# Patient Record
Sex: FEMALE | Race: BLACK OR AFRICAN AMERICAN | NOT HISPANIC OR LATINO | URBAN - METROPOLITAN AREA
[De-identification: names, ages, dates, MRNs, and addresses within clinical notes are randomized per-mention and may not be internally consistent; named-entity substitution may affect disease eponyms.]

---

## 2024-01-14 PROBLEM — H93.13 BILATERAL TINNITUS: Status: ACTIVE | Noted: 2024-01-14

## 2024-01-14 PROBLEM — I63.9: Status: RESOLVED | Noted: 2023-12-08 | Resolved: 2024-01-14

## 2024-01-14 PROBLEM — N39.41 URGE INCONTINENCE: Status: ACTIVE | Noted: 2024-01-14

## 2024-01-14 PROBLEM — N18.4 CKD (CHRONIC KIDNEY DISEASE) STAGE 4, GFR 15-29 ML/MIN (MULTI): Status: ACTIVE | Noted: 2023-02-10

## 2024-01-14 PROBLEM — H90.3 BILATERAL SENSORINEURAL HEARING LOSS: Status: ACTIVE | Noted: 2024-01-14

## 2024-01-14 PROBLEM — I10 ESSENTIAL HYPERTENSION: Status: ACTIVE | Noted: 2018-01-24

## 2024-01-14 PROBLEM — I87.2 CHRONIC VENOUS INSUFFICIENCY: Status: ACTIVE | Noted: 2018-11-21

## 2024-01-14 PROBLEM — Z86.711 HISTORY OF PULMONARY EMBOLISM: Status: ACTIVE | Noted: 2018-01-24

## 2024-01-14 PROBLEM — I83.93 ASYMPTOMATIC VARICOSE VEINS OF BOTH LOWER EXTREMITIES: Status: ACTIVE | Noted: 2018-08-15

## 2024-01-14 PROBLEM — D63.1 ANEMIA OF RENAL DISEASE: Status: ACTIVE | Noted: 2022-03-01

## 2024-01-14 PROBLEM — M51.369 DISC DEGENERATION, LUMBAR: Status: ACTIVE | Noted: 2024-01-14

## 2024-01-14 PROBLEM — M17.0 PRIMARY OSTEOARTHRITIS OF BOTH KNEES: Status: ACTIVE | Noted: 2018-11-28

## 2024-01-14 PROBLEM — M48.061 SPINAL STENOSIS OF LUMBAR REGION WITHOUT NEUROGENIC CLAUDICATION: Status: ACTIVE | Noted: 2021-06-09

## 2024-01-14 PROBLEM — M1A.0790 CHRONIC GOUT OF FOOT: Status: ACTIVE | Noted: 2018-02-02

## 2024-01-14 PROBLEM — K57.30 DIVERTICULOSIS OF COLON: Status: ACTIVE | Noted: 2020-11-04

## 2024-01-14 PROBLEM — R13.19 ESOPHAGEAL DYSPHAGIA: Status: ACTIVE | Noted: 2018-02-02

## 2024-01-14 PROBLEM — K59.09 CHRONIC CONSTIPATION: Status: ACTIVE | Noted: 2018-02-02

## 2024-01-14 PROBLEM — M51.36 DISC DEGENERATION, LUMBAR: Status: ACTIVE | Noted: 2024-01-14

## 2024-01-14 PROBLEM — K21.9 GASTROESOPHAGEAL REFLUX DISEASE WITHOUT ESOPHAGITIS: Status: ACTIVE | Noted: 2018-02-02

## 2024-01-14 PROBLEM — Z98.84 HISTORY OF GASTRIC BYPASS: Status: ACTIVE | Noted: 2019-02-28

## 2024-01-14 PROBLEM — E03.9 HYPOTHYROIDISM (ACQUIRED): Status: ACTIVE | Noted: 2022-03-14

## 2024-01-14 PROBLEM — E83.42 HYPOMAGNESEMIA: Status: ACTIVE | Noted: 2021-09-09

## 2024-01-14 PROBLEM — D50.9 IRON DEFICIENCY ANEMIA: Status: ACTIVE | Noted: 2018-04-28

## 2024-01-14 PROBLEM — R91.8 LUNG NODULES: Status: ACTIVE | Noted: 2018-02-02

## 2024-01-14 PROBLEM — N95.2 ATROPHIC VAGINITIS: Status: ACTIVE | Noted: 2024-01-14

## 2024-01-14 PROBLEM — E79.0 HYPERURICEMIA: Status: ACTIVE | Noted: 2020-12-17

## 2024-01-14 PROBLEM — N18.9 ANEMIA OF RENAL DISEASE: Status: ACTIVE | Noted: 2022-03-01

## 2024-01-14 PROBLEM — I25.84 CORONARY ARTERY CALCIFICATION: Status: ACTIVE | Noted: 2023-01-11

## 2024-01-14 PROBLEM — I63.9: Status: ACTIVE | Noted: 2023-12-08

## 2024-01-14 PROBLEM — H81.10 BENIGN PAROXYSMAL POSITIONAL VERTIGO: Status: ACTIVE | Noted: 2024-01-14

## 2024-01-14 PROBLEM — M16.0 PRIMARY OSTEOARTHRITIS OF BOTH HIPS: Status: ACTIVE | Noted: 2018-01-24

## 2024-01-14 PROBLEM — E55.9 VITAMIN D DEFICIENCY: Status: ACTIVE | Noted: 2018-02-02

## 2024-01-14 PROBLEM — E04.2 MULTINODULAR THYROID: Status: ACTIVE | Noted: 2018-02-02

## 2024-01-14 PROBLEM — J30.2 SEASONAL ALLERGIC RHINITIS: Status: ACTIVE | Noted: 2018-02-02

## 2024-01-14 PROBLEM — E78.5 HYPERLIPIDEMIA: Status: ACTIVE | Noted: 2023-12-07

## 2024-01-14 PROBLEM — N25.81 SECONDARY RENAL HYPERPARATHYROIDISM (MULTI): Status: ACTIVE | Noted: 2020-12-17

## 2024-01-14 PROBLEM — H72.01 CENTRAL PERFORATION OF TYMPANIC MEMBRANE, RIGHT: Status: ACTIVE | Noted: 2024-01-14

## 2024-01-14 PROBLEM — K63.5 HYPERPLASTIC POLYP OF LARGE INTESTINE: Status: ACTIVE | Noted: 2018-02-02

## 2024-01-14 PROBLEM — G47.09 OTHER INSOMNIA: Status: ACTIVE | Noted: 2018-02-02

## 2024-01-14 PROBLEM — N81.4 PROLAPSE, UTEROVAGINAL: Status: ACTIVE | Noted: 2024-01-14

## 2024-01-14 PROBLEM — F32.A CHRONIC DEPRESSION: Status: ACTIVE | Noted: 2018-02-02

## 2024-01-14 PROBLEM — I25.10 CORONARY ARTERY CALCIFICATION: Status: ACTIVE | Noted: 2023-01-11

## 2024-01-14 PROBLEM — I48.0 PAROXYSMAL ATRIAL FIBRILLATION (MULTI): Status: ACTIVE | Noted: 2020-11-10

## 2024-01-15 ENCOUNTER — OFFICE VISIT (OUTPATIENT)
Dept: PRIMARY CARE | Facility: CLINIC | Age: 74
End: 2024-01-15
Payer: MEDICARE

## 2024-01-15 ENCOUNTER — LAB (OUTPATIENT)
Dept: LAB | Facility: LAB | Age: 74
End: 2024-01-15
Payer: MEDICARE

## 2024-01-15 VITALS
SYSTOLIC BLOOD PRESSURE: 128 MMHG | HEIGHT: 67 IN | DIASTOLIC BLOOD PRESSURE: 74 MMHG | OXYGEN SATURATION: 98 % | BODY MASS INDEX: 36.73 KG/M2 | WEIGHT: 234 LBS | HEART RATE: 68 BPM

## 2024-01-15 DIAGNOSIS — Z86.711 HISTORY OF PULMONARY EMBOLISM: ICD-10-CM

## 2024-01-15 DIAGNOSIS — I48.0 PAROXYSMAL ATRIAL FIBRILLATION (MULTI): ICD-10-CM

## 2024-01-15 DIAGNOSIS — N18.9 ANEMIA OF RENAL DISEASE: ICD-10-CM

## 2024-01-15 DIAGNOSIS — J30.2 SEASONAL ALLERGIC RHINITIS, UNSPECIFIED TRIGGER: ICD-10-CM

## 2024-01-15 DIAGNOSIS — R91.8 LUNG NODULES: ICD-10-CM

## 2024-01-15 DIAGNOSIS — E55.9 VITAMIN D DEFICIENCY: ICD-10-CM

## 2024-01-15 DIAGNOSIS — N18.4 CKD (CHRONIC KIDNEY DISEASE) STAGE 4, GFR 15-29 ML/MIN (MULTI): ICD-10-CM

## 2024-01-15 DIAGNOSIS — Z12.31 ENCOUNTER FOR SCREENING MAMMOGRAM FOR MALIGNANT NEOPLASM OF BREAST: ICD-10-CM

## 2024-01-15 DIAGNOSIS — E79.0 HYPERURICEMIA: ICD-10-CM

## 2024-01-15 DIAGNOSIS — Z00.00 MEDICARE ANNUAL WELLNESS VISIT, SUBSEQUENT: Primary | ICD-10-CM

## 2024-01-15 DIAGNOSIS — R26.9 GAIT DISORDER: ICD-10-CM

## 2024-01-15 DIAGNOSIS — N25.81 SECONDARY RENAL HYPERPARATHYROIDISM (MULTI): ICD-10-CM

## 2024-01-15 DIAGNOSIS — D63.1 ANEMIA OF RENAL DISEASE: ICD-10-CM

## 2024-01-15 DIAGNOSIS — Z96.643 S/P BILATERAL HIP REPLACEMENTS: ICD-10-CM

## 2024-01-15 DIAGNOSIS — E03.9 HYPOTHYROIDISM (ACQUIRED): Primary | ICD-10-CM

## 2024-01-15 DIAGNOSIS — Z12.11 COLON CANCER SCREENING: ICD-10-CM

## 2024-01-15 DIAGNOSIS — I10 ESSENTIAL HYPERTENSION: ICD-10-CM

## 2024-01-15 DIAGNOSIS — E03.9 HYPOTHYROIDISM (ACQUIRED): ICD-10-CM

## 2024-01-15 DIAGNOSIS — K21.9 GASTROESOPHAGEAL REFLUX DISEASE WITHOUT ESOPHAGITIS: ICD-10-CM

## 2024-01-15 DIAGNOSIS — E78.2 MIXED HYPERLIPIDEMIA: ICD-10-CM

## 2024-01-15 DIAGNOSIS — I25.84 CORONARY ARTERY CALCIFICATION: ICD-10-CM

## 2024-01-15 DIAGNOSIS — K59.09 CHRONIC CONSTIPATION: ICD-10-CM

## 2024-01-15 DIAGNOSIS — I25.10 CORONARY ARTERY CALCIFICATION: ICD-10-CM

## 2024-01-15 DIAGNOSIS — E28.39 ESTROGEN DEFICIENCY: ICD-10-CM

## 2024-01-15 PROBLEM — N81.4 PROLAPSE, UTEROVAGINAL: Status: RESOLVED | Noted: 2024-01-14 | Resolved: 2024-01-15

## 2024-01-15 PROBLEM — Z96.652 STATUS POST LEFT KNEE REPLACEMENT: Status: ACTIVE | Noted: 2024-01-15

## 2024-01-15 LAB
ALBUMIN SERPL BCP-MCNC: 3.9 G/DL (ref 3.4–5)
ALP SERPL-CCNC: 88 U/L (ref 33–136)
ALT SERPL W P-5'-P-CCNC: 11 U/L (ref 7–45)
ANION GAP SERPL CALC-SCNC: 13 MMOL/L (ref 10–20)
APPEARANCE UR: CLEAR
AST SERPL W P-5'-P-CCNC: 20 U/L (ref 9–39)
BASOPHILS # BLD AUTO: 0.06 X10*3/UL (ref 0–0.1)
BASOPHILS NFR BLD AUTO: 1 %
BILIRUB SERPL-MCNC: 0.4 MG/DL (ref 0–1.2)
BILIRUB UR STRIP.AUTO-MCNC: NEGATIVE MG/DL
BUN SERPL-MCNC: 22 MG/DL (ref 6–23)
CALCIUM SERPL-MCNC: 9.7 MG/DL (ref 8.6–10.6)
CHLORIDE SERPL-SCNC: 108 MMOL/L (ref 98–107)
CHOLEST SERPL-MCNC: 136 MG/DL (ref 0–199)
CHOLESTEROL/HDL RATIO: 2.7
CO2 SERPL-SCNC: 28 MMOL/L (ref 21–32)
COLOR UR: YELLOW
CREAT SERPL-MCNC: 1.84 MG/DL (ref 0.5–1.05)
EGFRCR SERPLBLD CKD-EPI 2021: 29 ML/MIN/1.73M*2
EOSINOPHIL # BLD AUTO: 0.09 X10*3/UL (ref 0–0.4)
EOSINOPHIL NFR BLD AUTO: 1.5 %
ERYTHROCYTE [DISTWIDTH] IN BLOOD BY AUTOMATED COUNT: 14.7 % (ref 11.5–14.5)
GLUCOSE SERPL-MCNC: 91 MG/DL (ref 74–99)
GLUCOSE UR STRIP.AUTO-MCNC: NEGATIVE MG/DL
HCT VFR BLD AUTO: 38.4 % (ref 36–46)
HDLC SERPL-MCNC: 50.9 MG/DL
HGB BLD-MCNC: 12.7 G/DL (ref 12–16)
IMM GRANULOCYTES # BLD AUTO: 0.01 X10*3/UL (ref 0–0.5)
IMM GRANULOCYTES NFR BLD AUTO: 0.2 % (ref 0–0.9)
KETONES UR STRIP.AUTO-MCNC: NEGATIVE MG/DL
LDLC SERPL CALC-MCNC: 72 MG/DL
LEUKOCYTE ESTERASE UR QL STRIP.AUTO: NEGATIVE
LYMPHOCYTES # BLD AUTO: 1.34 X10*3/UL (ref 0.8–3)
LYMPHOCYTES NFR BLD AUTO: 22.3 %
MCH RBC QN AUTO: 26.7 PG (ref 26–34)
MCHC RBC AUTO-ENTMCNC: 33.1 G/DL (ref 32–36)
MCV RBC AUTO: 81 FL (ref 80–100)
MONOCYTES # BLD AUTO: 0.59 X10*3/UL (ref 0.05–0.8)
MONOCYTES NFR BLD AUTO: 9.8 %
NEUTROPHILS # BLD AUTO: 3.93 X10*3/UL (ref 1.6–5.5)
NEUTROPHILS NFR BLD AUTO: 65.2 %
NITRITE UR QL STRIP.AUTO: NEGATIVE
NON HDL CHOLESTEROL: 85 MG/DL (ref 0–149)
NRBC BLD-RTO: 0 /100 WBCS (ref 0–0)
PH UR STRIP.AUTO: 5 [PH]
PLATELET # BLD AUTO: 210 X10*3/UL (ref 150–450)
POTASSIUM SERPL-SCNC: 4.1 MMOL/L (ref 3.5–5.3)
PROT SERPL-MCNC: 6.5 G/DL (ref 6.4–8.2)
PROT UR STRIP.AUTO-MCNC: NEGATIVE MG/DL
RBC # BLD AUTO: 4.75 X10*6/UL (ref 4–5.2)
RBC # UR STRIP.AUTO: NEGATIVE /UL
SODIUM SERPL-SCNC: 145 MMOL/L (ref 136–145)
SP GR UR STRIP.AUTO: 1.01
TRIGL SERPL-MCNC: 64 MG/DL (ref 0–149)
URATE SERPL-MCNC: 7 MG/DL (ref 2.3–6.7)
UROBILINOGEN UR STRIP.AUTO-MCNC: <2 MG/DL
VLDL: 13 MG/DL (ref 0–40)
WBC # BLD AUTO: 6 X10*3/UL (ref 4.4–11.3)

## 2024-01-15 PROCEDURE — 81003 URINALYSIS AUTO W/O SCOPE: CPT

## 2024-01-15 PROCEDURE — 1159F MED LIST DOCD IN RCRD: CPT | Performed by: INTERNAL MEDICINE

## 2024-01-15 PROCEDURE — 1126F AMNT PAIN NOTED NONE PRSNT: CPT | Performed by: INTERNAL MEDICINE

## 2024-01-15 PROCEDURE — 85025 COMPLETE CBC W/AUTO DIFF WBC: CPT

## 2024-01-15 PROCEDURE — 80053 COMPREHEN METABOLIC PANEL: CPT

## 2024-01-15 PROCEDURE — 1036F TOBACCO NON-USER: CPT | Performed by: INTERNAL MEDICINE

## 2024-01-15 PROCEDURE — 36415 COLL VENOUS BLD VENIPUNCTURE: CPT

## 2024-01-15 PROCEDURE — 80061 LIPID PANEL: CPT

## 2024-01-15 PROCEDURE — G0439 PPPS, SUBSEQ VISIT: HCPCS | Performed by: INTERNAL MEDICINE

## 2024-01-15 PROCEDURE — 3074F SYST BP LT 130 MM HG: CPT | Performed by: INTERNAL MEDICINE

## 2024-01-15 PROCEDURE — 84550 ASSAY OF BLOOD/URIC ACID: CPT

## 2024-01-15 PROCEDURE — 3078F DIAST BP <80 MM HG: CPT | Performed by: INTERNAL MEDICINE

## 2024-01-15 PROCEDURE — 99214 OFFICE O/P EST MOD 30 MIN: CPT | Performed by: INTERNAL MEDICINE

## 2024-01-15 RX ORDER — LEVOTHYROXINE SODIUM 112 UG/1
112 TABLET ORAL
Qty: 90 TABLET | Refills: 3 | Status: SHIPPED | OUTPATIENT
Start: 2024-01-15 | End: 2024-05-15 | Stop reason: SDUPTHER

## 2024-01-15 RX ORDER — MECLIZINE HYDROCHLORIDE 25 MG/1
25 TABLET ORAL 3 TIMES DAILY PRN
COMMUNITY
Start: 2015-10-19

## 2024-01-15 RX ORDER — ASPIRIN 81 MG
1 TABLET, DELAYED RELEASE (ENTERIC COATED) ORAL DAILY
COMMUNITY

## 2024-01-15 RX ORDER — ROSUVASTATIN CALCIUM 10 MG/1
1 TABLET, COATED ORAL NIGHTLY
COMMUNITY
Start: 2023-01-11

## 2024-01-15 RX ORDER — POTASSIUM CHLORIDE 20 MEQ/1
1 TABLET, EXTENDED RELEASE ORAL 2 TIMES DAILY
COMMUNITY
Start: 2013-12-12 | End: 2024-01-15 | Stop reason: ALTCHOICE

## 2024-01-15 RX ORDER — TORSEMIDE 10 MG/1
10 TABLET ORAL DAILY
Qty: 90 TABLET | Refills: 3 | Status: SHIPPED | OUTPATIENT
Start: 2024-01-15

## 2024-01-15 RX ORDER — LEVOTHYROXINE SODIUM 112 UG/1
112 TABLET ORAL
COMMUNITY
Start: 2023-01-11 | End: 2024-01-15 | Stop reason: SDUPTHER

## 2024-01-15 RX ORDER — ALLOPURINOL 100 MG/1
50 TABLET ORAL
COMMUNITY
Start: 2023-12-08

## 2024-01-15 RX ORDER — PANTOPRAZOLE SODIUM 40 MG/1
40 TABLET, DELAYED RELEASE ORAL
COMMUNITY
Start: 2021-09-26

## 2024-01-15 RX ORDER — TORSEMIDE 10 MG/1
10 TABLET ORAL DAILY
COMMUNITY
End: 2024-01-15 | Stop reason: SDUPTHER

## 2024-01-15 RX ORDER — ASCORBIC ACID 500 MG
500 TABLET ORAL
COMMUNITY
Start: 2023-01-11

## 2024-01-15 RX ORDER — FERROUS SULFATE 325(65) MG
1 TABLET ORAL
COMMUNITY
Start: 2022-06-15

## 2024-01-15 RX ORDER — CALCITRIOL 0.25 UG/1
0.25 CAPSULE ORAL DAILY
COMMUNITY

## 2024-01-15 RX ORDER — CYCLOBENZAPRINE HCL 5 MG
1 TABLET ORAL 2 TIMES DAILY PRN
COMMUNITY
Start: 2021-05-30 | End: 2024-01-15 | Stop reason: ALTCHOICE

## 2024-01-15 RX ORDER — ROPINIROLE 0.5 MG/1
TABLET, FILM COATED ORAL
COMMUNITY
Start: 2016-09-19 | End: 2024-01-15 | Stop reason: ALTCHOICE

## 2024-01-15 RX ORDER — LORATADINE 10 MG/1
10 TABLET ORAL DAILY
COMMUNITY
Start: 2021-09-26

## 2024-01-15 RX ORDER — NIFEDIPINE 60 MG/1
60 TABLET, EXTENDED RELEASE ORAL DAILY
COMMUNITY
End: 2024-05-13 | Stop reason: DRUGHIGH

## 2024-01-15 RX ORDER — TALC
1 POWDER (GRAM) TOPICAL 2 TIMES DAILY
COMMUNITY
Start: 2021-12-30

## 2024-01-15 RX ORDER — AMLODIPINE BESYLATE 5 MG/1
TABLET ORAL
COMMUNITY
Start: 2015-10-19 | End: 2024-01-15 | Stop reason: ALTCHOICE

## 2024-01-15 RX ORDER — CHOLECALCIFEROL (VITAMIN D3) 50 MCG
1 TABLET ORAL DAILY
COMMUNITY

## 2024-01-15 ASSESSMENT — ENCOUNTER SYMPTOMS
SHORTNESS OF BREATH: 0
CONSTIPATION: 0
MEMORY LOSS: 0
HEMATURIA: 0
PALPITATIONS: 0
OCCASIONAL FEELINGS OF UNSTEADINESS: 0
CHILLS: 0
NERVOUS/ANXIOUS: 0
MUSCLE CRAMPS: 0
ABDOMINAL PAIN: 0
DEPRESSION: 0
FEVER: 0
NAUSEA: 0
BACK PAIN: 0
LOSS OF SENSATION IN FEET: 0
COUGH: 0

## 2024-01-15 NOTE — PATIENT INSTRUCTIONS
Medicare annual wellness visit  Continued well-balanced diet rich in fruits, vegetables, fiber, lean protein recommended  Continued active lifestyle, walking for up to 15 or 20 minutes daily recommended  Breast exam normal and screening mammogram scheduled for February  Bone densitometry to screen for osteoporosis ordered  Providing a copy of advance directives to place in chart is recommended  Screening colonoscopy recommended (previous colonoscopy at [facility] in 2021 with poor prep with repeat colonoscopy recommended).  Dr. Mark contacted and recommends colonoscopy October/November 2024    Essential hypertension  Improved.  Continue nifedipine XL 60 mg daily and torsemide 10 mg daily  Continue home blood pressure monitoring and bring readings to each visit.  Bring blood pressure device to next visit (to assess accuracy)    Paroxysmal atrial fibrillation (regular rhythm today)  Continue Eliquis 5 mg twice a day  Upcoming appointment with Dr. Hudson in electrophysiology scheduled for February    Hyperlipidemia  Coronary artery calcification  Fasting lipid panel ordered  Continue rosuvastatin 10 mg daily  Continue well-balanced, low-fat/high-fiber diet  Continue annual follow-up with cardiology, Dr. Alfredo at [facility]    Chronic kidney disease (stage IV)  Secondary renal hyperparathyroidism  Vitamin D deficiency  Continue current medication regimen  Maintain adequate hydration, at least 50 ounces per day  Lab work ordered  Continue routine follow-up with [facility] nephrology, Sabrina Weber  Patient to follow-up with [facility] transplant team regarding whether additional follow-up recommended at this time    History of pulmonary embolism  Continue long-term anticoagulation with Eliquis    Anemia of renal disease  CBC lab work ordered    Hypothyroidism (TSH at goal on recent lab work)  Continue levothyroxine 112 mcg daily    Lung nodule/history of smoking  Continue annual CT lung cancer screening at [facility], next scheduled in  9/2024    GERD/heartburn  Continue pantoprazole 40 mg daily    Seasonal allergic rhinitis  Continue Claritin 10 mg daily as needed    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

## 2024-01-15 NOTE — PROGRESS NOTES
Bryan Garcia is a 70 year old here for a Medicare Annual Wellness Visit     Health Risk Assessment  In general, health is:  Good     Concerns with balance:  Good     Concerns with teeth or dentures:  No     Concerns with sexual function:  No     Felt anxious, stressed, angry, irritable, lonely, isolated, or had thoughts of hurting themself:  Rare     Has little interest or pleasure in doing things:  No     Bothered by feeling down, depressed, or hopeless:  No     Needs help with grocery shopping, cooking, housework, bathing, grooming, dressing, eating, sitting or standing, walking, using the toilet, handling finances, taking medications, using the telephone, or driving:  No     Following safety precautions in the home environment and vehicle: removed throw rugs from floors, installed grab bars in the bathroom, handrails in stairwells, having adequate lighting, wearing seatbelt at all times?:  Yes     Smokes cigarettes, vapes, or chew tobacco:  No     Eats healthy foods including fruits, vegetables, whole grains, and fiber-rich foods:  Regularly     Number of days per week engages in exercise:  Walking at house 2-3 times per week     Average alcohol consumption: None        Current Providers  Specialists: I have reviewed specialist-related care of the patient in the medical record.     Medical/Family history review  Reviewed and updated problem list, medical/surgical/family/social history, medications, and allergies.     Opioid use review  Patient use of opioids:  No           Depression screening  Depression Screening PHQ-2 Score   2/14/2023 0         Depression screening tool completed and reviewed. Based on score and interview, patient is not at risk for depression. Screening tool discussed with patient, and I recommended no further intervention at this time.     Cognitive screening  Mini Cog Score:  5     Cognitive screening reviewed and plan:  None     Functional Observation  Was the patient's timed Up  "& Go test unsteady or >= 12 seconds?  No     Advance Care Planning  End of Life planning discussed, including patient's advanced directive wishes:  Yes    Vision and Hearing assessment  Visual acuity (required for Welcome to Medicare):  Sees ophthalmology  Hearing Evaluation:  Normal    ====================================================    /74 (BP Location: Left arm, Patient Position: Sitting, BP Cuff Size: Adult)   Pulse 68   Ht 1.689 m (5' 6.5\")   Wt 106 kg (234 lb)   LMP  (LMP Unknown)   SpO2 98%   BMI 37.20 kg/m²     Chief Complaint   Patient presents with    Medicare Wellness Exam       Medicare annual wellness visit  Office visit for chronic medical conditions    Essential hypertension  In May, ARB (losartan) and spironolactone discontinued due to LINDSEY and hyperkalemia in setting of chronic kidney disease.  At last visit with nephrology at [facility] on 1/4, nifedipine dose increased from 30 mg to 60 mg daily.  Blood pressures have been better at home ranging between 120-130s/70s.  Blood pressure in office today is at goal.  Improved.  Continue nifedipine XL 60 mg daily and torsemide 10 mg daily    Paroxysmal atrial fibrillation   Amiodarone discontinued by Dr. Alfredo at last visit.  Recent visit with Dr. Hudson in December without change.  Patient remains on Eliquis alone  Next appointment with Dr. Hudson in February  Patient denies any palpitations, shortness of breath, bleeding complications.    Hyperlipidemia  Coronary artery calcification  Patient is doing well without chest pain or shortness of breath.  Proper diet reviewed.  Lipid panel ordered.    Chronic kidney disease (stage 4)  Previous LINDSEY in May has improved off losartan and spironolactone.  Last visit 2 weeks ago with nephrology at [facility], increasing nifedipine dosing.  Patient has undergone transplant workup at [facility] in September.  Patient has not heard back from transplant team, possibly secondary to improved creatinine per nephrology at last " visit.    Secondary renal hyperparathyroidism  Vitamin D deficiency  Current vitamin D level is at goal.    History of pulmonary embolism (recurrent), hypercoagulable state  Patient remains on long-term anticoagulation.    Anemia of renal disease  Most recent CBC stable, CBC lab work ordered    Hypothyroidism  Recent TSH in December at goal on current levothyroxine dose at 112 mcg daily.     Lung nodule/history of smoking  Patient has not smoked since 2009.  She is enrolled in [facility] lung cancer screening clinic, next CT/office visit scheduled in September 2024    GERD/heartburn  Patient denies any breakthrough symptoms or dysphagia, remains on pantoprazole.    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  Patient notes difficulty with hip flexion, history of left total hip arthroplasty per Dr. Flores    Hyperuricemia  Patient is currently on allopurinol 50 mg daily.    Chronic constipation  Regular, not forced bowel movements currently.  Only needs Colace as needed.    Health maintenance  Exercise: Active at home, occasional walking  Colon cancer screening: Most recent colonoscopy at [facility] 12/2021 with poor prep.  Dr. Mark to resume screening colonoscopies and recommends next colonoscopy in October/November 2024    Social  Lives in home independently, assisted by her daughter Flores Posey since 4/2/2018 ( Samir with pulmonary hypertension)          Review of Systems   Constitutional: Negative for chills and fever.   HENT:  Positive for tinnitus. Negative for hearing loss.    Eyes:  Negative for visual disturbance.   Cardiovascular:  Negative for chest pain and palpitations.   Respiratory:  Negative for cough and shortness of breath.    Endocrine: Negative for polyuria.   Skin:  Negative for rash.    Musculoskeletal:  Positive for arthritis. Negative for back pain and muscle cramps.   Gastrointestinal:  Negative for abdominal pain, constipation, dysphagia and nausea.   Genitourinary:  Negative for hematuria, pelvic pain and urgency.   Psychiatric/Behavioral:  Negative for memory loss. The patient is not nervous/anxious.         Physical Exam  Vitals reviewed.   Constitutional:       Appearance: Normal appearance.   HENT:      Head: Normocephalic.      Left Ear: Tympanic membrane normal.      Ears:      Comments: Central TM perforation (chronic)     Mouth/Throat:      Mouth: Mucous membranes are moist.   Eyes:      Conjunctiva/sclera: Conjunctivae normal.      Pupils: Pupils are equal, round, and reactive to light.   Cardiovascular:      Rate and Rhythm: Normal rate and regular rhythm.   Pulmonary:      Effort: Pulmonary effort is normal.      Breath sounds: Normal breath sounds.   Abdominal:      Palpations: Abdomen is soft.      Tenderness: There is no abdominal tenderness.   Genitourinary:     Comments: Breast exam: Normal nipples, no masses or axillary lymphadenopathy bilaterally.  Musculoskeletal:      Comments: Bilateral hips and knees: Diminished left hip flexion.  Bilateral knees with intact range of motion.  2+ bilateral lower calf and ankle edema   Skin:     General: Skin is warm.      Findings: No rash.   Neurological:      General: No focal deficit present.      Mental Status: She is alert.   Psychiatric:         Mood and Affect: Mood normal.           Assessment and Plan    Medicare annual wellness visit  Continued well-balanced diet rich in fruits, vegetables, fiber, lean protein recommended  Continued active lifestyle, walking for up to 15 or 20 minutes daily recommended  Breast exam normal and screening mammogram scheduled for February  Bone densitometry to screen for osteoporosis ordered  Providing a copy of advance directives to place in chart is recommended  Screening colonoscopy recommended  (previous colonoscopy at [facility] in 2021 with poor prep with repeat colonoscopy recommended).  Dr. Mark contacted and recommends colonoscopy October/November 2024    Essential hypertension  Improved.  Continue nifedipine XL 60 mg daily and torsemide 10 mg daily  Continue home blood pressure monitoring and bring readings to each visit.  Bring blood pressure device to next visit (to assess accuracy)    Paroxysmal atrial fibrillation (regular rhythm today)  Continue Eliquis 5 mg twice a day  Upcoming appointment with Dr. Hudson in electrophysiology scheduled for February    Hyperlipidemia  Coronary artery calcification  Fasting lipid panel ordered  Continue rosuvastatin 10 mg daily  Continue well-balanced, low-fat/high-fiber diet  Continue annual follow-up with cardiology, Dr. Alfredo at [facility]    Chronic kidney disease (stage IV)  Secondary renal hyperparathyroidism  Vitamin D deficiency  Continue current medication regimen  Maintain adequate hydration, at least 50 ounces per day  Lab work ordered  Continue routine follow-up with [facility] nephrology, Sabrina Weber  Patient to follow-up with [facility] transplant team regarding whether additional follow-up recommended at this time    History of pulmonary embolism  Continue long-term anticoagulation with Eliquis    Anemia of renal disease  CBC lab work ordered    Hypothyroidism (TSH at goal on recent lab work)  Continue levothyroxine 112 mcg daily    Lung nodule/history of smoking  Continue annual CT lung cancer screening at [facility], next scheduled in 9/2024    GERD/heartburn  Continue pantoprazole 40 mg daily    Seasonal allergic rhinitis  Continue Claritin 10 mg daily as needed    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

## 2024-01-15 NOTE — Clinical Note
Cecil, Our mutual patient was seen today for medical annual wellness visit. I am asking for your recommendation regarding timing of next screening colonoscopy. Last colonoscopy done at [facility] in 12/2021 during hospitalization for evaluation of GI symptoms/diarrhea.  Report and pathology are available in Care Everywhere. Thank you for your review and recommendation regarding timing of next screening colonoscopy. Jorge

## 2024-01-15 NOTE — PROGRESS NOTES
TSH ordered for next visit in May  ([facility] nephrology will be checking renal function panel, CBC urine)    Adán Meek MD

## 2024-01-16 ENCOUNTER — TELEPHONE (OUTPATIENT)
Dept: PRIMARY CARE | Facility: CLINIC | Age: 74
End: 2024-01-16
Payer: MEDICARE

## 2024-01-16 NOTE — LETTER
January 16, 2024    Bryan Garcia  [address] [facility] [address]      Dear Bryan:    I have enclosed a copy of your lab results for your review.  These results are also viewable to your other providers at [facility].    Electrolytes are normal, kidney function is stable, urinalysis is normal.  The CBC (blood counts) are normal without anemia.  The lipid panel results are also favorable/at goal on current dose of rosuvastatin.    Please contact my office if you have any questions or concerns regarding these results.      Sincerely,        Adán Meek MD  [facility]  [address] [place] [address], [place] [address]    Phone:  [phone]  Fax:  [phone]

## 2024-01-16 NOTE — TELEPHONE ENCOUNTER
Current lab work shows stable creatinine consistent with chronic kidney disease, stage 4  Lipid panel results are at goal, currently on rosuvastatin 10 mg daily.    Letter has been sent to patient with results, to review with other specialists at future visits.    Adán Meek MD

## 2024-03-06 ENCOUNTER — APPOINTMENT (OUTPATIENT)
Dept: RADIOLOGY | Facility: HOSPITAL | Age: 74
End: 2024-03-06
Payer: MEDICARE

## 2024-03-14 DIAGNOSIS — Z12.11 SPECIAL SCREENING FOR MALIGNANT NEOPLASMS, COLON: Primary | ICD-10-CM

## 2024-03-14 RX ORDER — SODIUM PICOSULFATE, MAGNESIUM OXIDE, AND ANHYDROUS CITRIC ACID 12; 3.5; 1 G/175ML; G/175ML; MG/175ML
LIQUID ORAL
Qty: 350 ML | Refills: 0 | Status: SHIPPED | OUTPATIENT
Start: 2024-03-14 | End: 2024-05-13 | Stop reason: ALTCHOICE

## 2024-03-19 ENCOUNTER — TELEPHONE (OUTPATIENT)
Dept: GASTROENTEROLOGY | Facility: CLINIC | Age: 74
End: 2024-03-19
Payer: MEDICARE

## 2024-03-19 DIAGNOSIS — Z12.11 SPECIAL SCREENING FOR MALIGNANT NEOPLASMS, COLON: Primary | ICD-10-CM

## 2024-03-19 RX ORDER — POLYETHYLENE GLYCOL-3350 AND ELECTROLYTES WITH FLAVOR PACK 240; 5.84; 2.98; 6.72; 22.72 G/278.26G; G/278.26G; G/278.26G; G/278.26G; G/278.26G
4000 POWDER, FOR SOLUTION ORAL ONCE
Qty: 4000 ML | Refills: 0 | Status: SHIPPED | OUTPATIENT
Start: 2024-03-19 | End: 2024-03-19

## 2024-03-19 NOTE — TELEPHONE ENCOUNTER
Phone - has colonoscopy tomorrow - Clenpiq was called in to her pharmacy - she has significant CKD - I rec: do not take Clenpiq, use Colyte instead

## 2024-03-19 NOTE — TELEPHONE ENCOUNTER
----- Message from Yamilex Robledo MA sent at 3/19/2024  3:18 PM EDT -----  Calling to ask if prep is safe for procedure tomorrow [phone]

## 2024-03-19 NOTE — TELEPHONE ENCOUNTER
----- Message from Yamilex Robledo MA sent at 3/19/2024 10:07 AM EDT -----  Please call has questions about prep [phone]

## 2024-03-20 ENCOUNTER — OFFICE VISIT (OUTPATIENT)
Dept: GASTROENTEROLOGY | Facility: EXTERNAL LOCATION | Age: 74
End: 2024-03-20
Payer: MEDICARE

## 2024-03-20 DIAGNOSIS — Z12.11 COLON CANCER SCREENING: ICD-10-CM

## 2024-03-20 DIAGNOSIS — D12.7 BENIGN NEOPLASM OF RECTOSIGMOID JUNCTION: ICD-10-CM

## 2024-03-20 DIAGNOSIS — I48.91 ATRIAL FIBRILLATION, UNSPECIFIED TYPE (MULTI): ICD-10-CM

## 2024-03-20 DIAGNOSIS — K57.30 DIVERTICULOSIS OF LARGE INTESTINE WITHOUT DIVERTICULITIS: ICD-10-CM

## 2024-03-20 DIAGNOSIS — Z86.010 PERSONAL HISTORY OF COLONIC POLYPS: Primary | ICD-10-CM

## 2024-03-20 PROCEDURE — 1036F TOBACCO NON-USER: CPT | Performed by: INTERNAL MEDICINE

## 2024-03-20 PROCEDURE — 88305 TISSUE EXAM BY PATHOLOGIST: CPT | Performed by: PATHOLOGY

## 2024-03-20 PROCEDURE — 88305 TISSUE EXAM BY PATHOLOGIST: CPT

## 2024-03-20 PROCEDURE — 45380 COLONOSCOPY AND BIOPSY: CPT | Performed by: INTERNAL MEDICINE

## 2024-03-20 PROCEDURE — 1159F MED LIST DOCD IN RCRD: CPT | Performed by: INTERNAL MEDICINE

## 2024-03-21 ENCOUNTER — LAB REQUISITION (OUTPATIENT)
Dept: LAB | Facility: HOSPITAL | Age: 74
End: 2024-03-21
Payer: MEDICARE

## 2024-03-25 ENCOUNTER — APPOINTMENT (OUTPATIENT)
Dept: RADIOLOGY | Facility: HOSPITAL | Age: 74
End: 2024-03-25
Payer: MEDICARE

## 2024-03-25 LAB
LABORATORY COMMENT REPORT: NORMAL
PATH REPORT.FINAL DX SPEC: NORMAL
PATH REPORT.GROSS SPEC: NORMAL
PATH REPORT.RELEVANT HX SPEC: NORMAL
PATH REPORT.TOTAL CANCER: NORMAL

## 2024-04-03 ENCOUNTER — HOSPITAL ENCOUNTER (OUTPATIENT)
Dept: RADIOLOGY | Facility: HOSPITAL | Age: 74
Discharge: HOME | End: 2024-04-03
Payer: MEDICARE

## 2024-04-03 VITALS — WEIGHT: 235 LBS | BODY MASS INDEX: 36.88 KG/M2 | HEIGHT: 67 IN

## 2024-04-03 DIAGNOSIS — Z12.31 ENCOUNTER FOR SCREENING MAMMOGRAM FOR MALIGNANT NEOPLASM OF BREAST: ICD-10-CM

## 2024-04-03 PROCEDURE — 77067 SCR MAMMO BI INCL CAD: CPT | Mod: BILATERAL PROCEDURE | Performed by: STUDENT IN AN ORGANIZED HEALTH CARE EDUCATION/TRAINING PROGRAM

## 2024-04-03 PROCEDURE — 77067 SCR MAMMO BI INCL CAD: CPT

## 2024-04-03 PROCEDURE — 77063 BREAST TOMOSYNTHESIS BI: CPT | Mod: BILATERAL PROCEDURE | Performed by: STUDENT IN AN ORGANIZED HEALTH CARE EDUCATION/TRAINING PROGRAM

## 2024-04-11 ENCOUNTER — TELEPHONE (OUTPATIENT)
Dept: PRIMARY CARE | Facility: CLINIC | Age: 74
End: 2024-04-11
Payer: MEDICARE

## 2024-04-11 DIAGNOSIS — N64.89 BREAST ASYMMETRY: ICD-10-CM

## 2024-04-11 NOTE — TELEPHONE ENCOUNTER
Kathytaniamarika is requesting diagnostic mammogram order, ultrasound order (right breast).  Orders pended.  Please sign.  Thank you

## 2024-04-16 ENCOUNTER — HOSPITAL ENCOUNTER (OUTPATIENT)
Dept: RADIOLOGY | Facility: HOSPITAL | Age: 74
Discharge: HOME | End: 2024-04-16
Payer: MEDICARE

## 2024-04-16 DIAGNOSIS — N64.89 BREAST ASYMMETRY: ICD-10-CM

## 2024-04-16 DIAGNOSIS — R92.8 OTHER ABNORMAL AND INCONCLUSIVE FINDINGS ON DIAGNOSTIC IMAGING OF BREAST: ICD-10-CM

## 2024-04-16 PROCEDURE — 77065 DX MAMMO INCL CAD UNI: CPT | Mod: RIGHT SIDE | Performed by: STUDENT IN AN ORGANIZED HEALTH CARE EDUCATION/TRAINING PROGRAM

## 2024-04-16 PROCEDURE — 77061 BREAST TOMOSYNTHESIS UNI: CPT | Mod: RT

## 2024-04-16 PROCEDURE — G0279 TOMOSYNTHESIS, MAMMO: HCPCS | Mod: RIGHT SIDE | Performed by: STUDENT IN AN ORGANIZED HEALTH CARE EDUCATION/TRAINING PROGRAM

## 2024-04-18 ENCOUNTER — TELEPHONE (OUTPATIENT)
Dept: PRIMARY CARE | Facility: CLINIC | Age: 74
End: 2024-04-18

## 2024-04-18 NOTE — TELEPHONE ENCOUNTER
Please contact patient to inform her that diagnostic right mammogram was normal.    She should return to annual mammography screening, next due in April 2025.    Adán Meek MD

## 2024-04-22 NOTE — TELEPHONE ENCOUNTER
CATRINAI, I'm unable to reach her.  I've tried several times. Her voicemail hasn't been set up, so I can't leave a message.  Should I try to reach her daughter?

## 2024-05-13 ENCOUNTER — LAB (OUTPATIENT)
Dept: LAB | Facility: LAB | Age: 74
End: 2024-05-13
Payer: MEDICARE

## 2024-05-13 ENCOUNTER — OFFICE VISIT (OUTPATIENT)
Dept: PRIMARY CARE | Facility: CLINIC | Age: 74
End: 2024-05-13
Payer: MEDICARE

## 2024-05-13 VITALS
SYSTOLIC BLOOD PRESSURE: 140 MMHG | HEART RATE: 61 BPM | WEIGHT: 236 LBS | BODY MASS INDEX: 36.96 KG/M2 | OXYGEN SATURATION: 97 % | DIASTOLIC BLOOD PRESSURE: 82 MMHG

## 2024-05-13 DIAGNOSIS — I10 ESSENTIAL HYPERTENSION: ICD-10-CM

## 2024-05-13 DIAGNOSIS — K21.9 GASTROESOPHAGEAL REFLUX DISEASE WITHOUT ESOPHAGITIS: ICD-10-CM

## 2024-05-13 DIAGNOSIS — R26.9 GAIT DISORDER: ICD-10-CM

## 2024-05-13 DIAGNOSIS — N25.81 SECONDARY RENAL HYPERPARATHYROIDISM (MULTI): ICD-10-CM

## 2024-05-13 DIAGNOSIS — I25.84 CORONARY ARTERY CALCIFICATION: ICD-10-CM

## 2024-05-13 DIAGNOSIS — N18.9 ANEMIA OF RENAL DISEASE: ICD-10-CM

## 2024-05-13 DIAGNOSIS — E03.9 HYPOTHYROIDISM (ACQUIRED): ICD-10-CM

## 2024-05-13 DIAGNOSIS — R29.898 LEFT LEG WEAKNESS: ICD-10-CM

## 2024-05-13 DIAGNOSIS — Z96.643 S/P BILATERAL HIP REPLACEMENTS: ICD-10-CM

## 2024-05-13 DIAGNOSIS — D63.1 ANEMIA OF RENAL DISEASE: ICD-10-CM

## 2024-05-13 DIAGNOSIS — E03.9 HYPOTHYROIDISM (ACQUIRED): Primary | ICD-10-CM

## 2024-05-13 DIAGNOSIS — K63.5 HYPERPLASTIC POLYP OF LARGE INTESTINE: ICD-10-CM

## 2024-05-13 DIAGNOSIS — E79.0 HYPERURICEMIA: ICD-10-CM

## 2024-05-13 DIAGNOSIS — I25.10 CORONARY ARTERY CALCIFICATION: ICD-10-CM

## 2024-05-13 DIAGNOSIS — I48.0 PAROXYSMAL ATRIAL FIBRILLATION (MULTI): ICD-10-CM

## 2024-05-13 DIAGNOSIS — R91.8 LUNG NODULES: ICD-10-CM

## 2024-05-13 DIAGNOSIS — N18.4 CKD (CHRONIC KIDNEY DISEASE) STAGE 4, GFR 15-29 ML/MIN (MULTI): ICD-10-CM

## 2024-05-13 DIAGNOSIS — Z86.711 HISTORY OF PULMONARY EMBOLISM: ICD-10-CM

## 2024-05-13 DIAGNOSIS — E78.2 MIXED HYPERLIPIDEMIA: ICD-10-CM

## 2024-05-13 LAB — TSH SERPL-ACNC: 4.16 MIU/L (ref 0.44–3.98)

## 2024-05-13 PROCEDURE — 3079F DIAST BP 80-89 MM HG: CPT | Performed by: INTERNAL MEDICINE

## 2024-05-13 PROCEDURE — 84443 ASSAY THYROID STIM HORMONE: CPT

## 2024-05-13 PROCEDURE — 84439 ASSAY OF FREE THYROXINE: CPT

## 2024-05-13 PROCEDURE — 3077F SYST BP >= 140 MM HG: CPT | Performed by: INTERNAL MEDICINE

## 2024-05-13 PROCEDURE — 1036F TOBACCO NON-USER: CPT | Performed by: INTERNAL MEDICINE

## 2024-05-13 PROCEDURE — 99214 OFFICE O/P EST MOD 30 MIN: CPT | Performed by: INTERNAL MEDICINE

## 2024-05-13 PROCEDURE — 1159F MED LIST DOCD IN RCRD: CPT | Performed by: INTERNAL MEDICINE

## 2024-05-13 PROCEDURE — 36415 COLL VENOUS BLD VENIPUNCTURE: CPT

## 2024-05-13 RX ORDER — LEVOTHYROXINE SODIUM 112 UG/1
112 TABLET ORAL
Qty: 90 TABLET | Refills: 3 | Status: CANCELLED | OUTPATIENT
Start: 2024-05-13

## 2024-05-13 RX ORDER — NIFEDIPINE 90 MG/1
90 TABLET, EXTENDED RELEASE ORAL DAILY
COMMUNITY
Start: 2024-05-02

## 2024-05-13 ASSESSMENT — ENCOUNTER SYMPTOMS
BACK PAIN: 0
DIARRHEA: 0
WEAKNESS: 1
NECK STIFFNESS: 0
ARTHRALGIAS: 1
FATIGUE: 0
DIZZINESS: 0
PALPITATIONS: 0
SHORTNESS OF BREATH: 0
HEMATURIA: 0
DYSURIA: 0
BLOOD IN STOOL: 0
CONSTIPATION: 0
ABDOMINAL PAIN: 0

## 2024-05-13 NOTE — PROGRESS NOTES
Subjective   Patient ID: Bryan Garcia is a 73 y.o. female who presents for Follow-up.    Routine follow-up     Essential hypertension  Nifedipine recently increased from 60 to 90 mg daily at recent nephrology visit on 5/2  Patient states that home blood pressure readings have been in the 130-135/70s range.  Home device today checked for accuracy:  Home = 148/90, office = 140/82     Paroxysmal atrial fibrillation  Recent visit with Dr. Hudson at which time patient noted to be in atrial fibrillation per ECG.  Patient is not on any antiarrhythmic medicine, previously was on amiodarone.  No change in therapy made, 6-week follow-up scheduled for July at which time A-fib will be reevaluated.       Hyperlipidemia  Coronary artery calcification  No chest pain or shortness of breath.  Patient remains on rosuvastatin.  Annual follow-up with cardiology, Dr. Alfredo at [facility] (next appointment in 11/2024)     Chronic kidney disease (stage 4)  Secondary renal hyperparathyroidism  Vitamin D deficiency  Most recent creatinine stable at 1.9.  Patient has been previously evaluated by the [facility] transplant team    Venous insufficiency  Stable lower extremity edema.  Patient continues to wear support hose.     History of pulmonary embolism  Long-term anticoagulation with Eliquis, no chest pain, shortness of breath, bleeding complications     Anemia of renal disease  Recent CBC normal.     Hypothyroidism   No unintentional weight gain or loss, constipation or diarrhea, heat or cold intolerance.  TSH to be completed today.     Lung nodule/history of smoking  Annual CT lung cancer screening at [facility], next scheduled in 9/2024     GERD/heartburn  No breakthrough symptoms or dysphagia on pantoprazole.     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 in January which has been helpful.  Left leg weakness is improving.  Patient needs reauthorization for  PT to continue, provided today.  Copy of prescription for rollator provided.  Patient continues to use cane and rollator as needed.  No recent falls.     Hyperuricemia  Stable on low-dose allopurinol     Chronic constipation  Patient is having regular bowel movements, occasionally uses Colace.    Health maintenance  COVID-19 booster vaccine recommended.  Tdap vaccine due in September           Review of Systems   Constitutional:  Negative for fatigue.   Respiratory:  Negative for shortness of breath.    Cardiovascular:  Positive for leg swelling. Negative for chest pain and palpitations.   Gastrointestinal:  Negative for abdominal pain, blood in stool, constipation and diarrhea.   Genitourinary:  Negative for dysuria and hematuria.   Musculoskeletal:  Positive for arthralgias and gait problem. Negative for back pain and neck stiffness.   Neurological:  Positive for weakness. Negative for dizziness.       Objective   /82 (BP Location: Left arm, Patient Position: Sitting, BP Cuff Size: Adult)   Pulse 61   Wt 107 kg (236 lb)   LMP  (LMP Unknown)   SpO2 97%   BMI 36.96 kg/m²     Physical Exam  Vitals reviewed.   HENT:      Head: Normocephalic.      Mouth/Throat:      Mouth: Mucous membranes are moist.   Eyes:      Conjunctiva/sclera: Conjunctivae normal.   Cardiovascular:      Rate and Rhythm: Normal rate. Rhythm irregular.   Pulmonary:      Effort: Pulmonary effort is normal.      Breath sounds: Normal breath sounds. No wheezing or rales.   Abdominal:      General: Bowel sounds are normal.      Tenderness: There is no abdominal tenderness.   Musculoskeletal:      Comments: 2+ bilateral ankle and calf edema   Skin:     General: Skin is warm.   Neurological:      Mental Status: She is alert.      Comments: Motor exam: 4/5 left hip flexion, remainder of lower extremity exam 5/5 motor strength  Mild gait disturbance and imbalance   Psychiatric:         Mood and Affect: Mood normal.         Assessment/Plan      Essential hypertension  Continue nifedipine XL 90 mg daily and torsemide 10 mg daily  Maintain low-salt diet  Continue home blood pressure monitoring and bring readings to each visit.    Home blood pressure device running approximately 5 points high.  Goal for blood pressure is under 130/80.  Contact office if blood pressure consistently above this goal.     Paroxysmal atrial fibrillation (recent ECG at cardiology appointment consistent with rate controlled atrial fibrillation, irregular rhythm on exam today)  Continue Eliquis 5 mg twice a day  Follow-up with Dr. Hudson as scheduled in July      Hyperlipidemia  Coronary artery calcification  Continue rosuvastatin 10 mg daily  Continue well-balanced, low-fat/high-fiber diet  Continue annual follow-up with cardiology, Dr. Alfredo at [facility] (next appointment in 11/2024)     Chronic kidney disease (stage 4)  Secondary renal hyperparathyroidism  Vitamin D deficiency  Continue current medication regimen  Maintain adequate hydration, at least 50 ounces per day  Continue routine follow-up with [facility] nephrology, Sabrina Weber (next appointment in 9/2024)    Venous insufficiency  Maintain low-salt diet, leg elevation, and use of compression hose/socks  Continue routine follow-up with [facility] vascular medicine, Dr. 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  Renewal PT order provided  today.  Prescription for rollator with seat provided     Hyperuricemia  Continue allopurinol 50 mg daily     Chronic constipation  Maintain adequate hydration, high-fiber/fruit/vegetable diet  Continue Colace 100 mg daily as needed    Health maintenance  COVID-19 booster vaccine recommended  Tdap vaccine recommended (due in 9/2024)  Patient plans to reschedule bone densitometry     Follow-up  1.  Office visit in September 2024  2.  Medicare annual wellness visit/physical on/after 1/16/2025    Adán Meek MD

## 2024-05-13 NOTE — PATIENT INSTRUCTIONS
Essential hypertension  Continue nifedipine XL 90 mg daily and torsemide 10 mg daily  Maintain low-salt diet  Continue home blood pressure monitoring and bring readings to each visit.    Home blood pressure device running approximately 5 points high.  Goal for blood pressure is under 130/80.  Contact office if blood pressure consistently above this goal.     Paroxysmal atrial fibrillation (recent ECG at cardiology appointment consistent with rate controlled atrial fibrillation, irregular rhythm on exam today)  Continue Eliquis 5 mg twice a day  Follow-up with Dr. Hduson as scheduled in July      Hyperlipidemia  Coronary artery calcification  Continue rosuvastatin 10 mg daily  Continue well-balanced, low-fat/high-fiber diet  Continue annual follow-up with cardiology, Dr. Alfredo at [facility] (next appointment in 11/2024)     Chronic kidney disease (stage 4)  Secondary renal hyperparathyroidism  Vitamin D deficiency  Continue current medication regimen  Maintain adequate hydration, at least 50 ounces per day  Continue routine follow-up with [facility] nephrology, Sabrina Weber (next appointment in 9/2024)    Venous insufficiency  Maintain low-salt diet, leg elevation, and use of compression hose/socks  Continue routine follow-up with [facility] vascular medicine, Dr. Linton     History of pulmonary embolism  Continue long-term anticoagulation with Eliquis     Anemia of renal disease  Continue iron supplementation  Monitor expectantly     Hypothyroidism   TSH lab work to be completed today  Continue levothyroxine 112 mcg daily     Lung nodule/history of smoking  Continue annual CT lung cancer screening at [facility], next scheduled in 9/2024     GERD/heartburn  Continue pantoprazole 40 mg daily     Seasonal allergic rhinitis  Continue Claritin 10 mg daily as needed     History of bilateral hip replacement with left hip stiffness/left leg weakness  Gait disorder  Physical therapy at [facility] is continuing.  Renewal PT order provided  today.  Prescription for rollator with seat provided     Hyperuricemia  Continue allopurinol 50 mg daily     Chronic constipation  Maintain adequate hydration, high-fiber/fruit/vegetable diet  Continue Colace 100 mg daily as needed    Health maintenance  COVID-19 booster vaccine recommended  Tdap vaccine recommended (due in 9/2024)    Follow-up  1.  Office visit in September 2024  2.  Medicare annual wellness visit/physical on/after 1/16/2025

## 2024-05-14 LAB — T4 FREE SERPL-MCNC: 1.36 NG/DL (ref 0.78–1.48)

## 2024-05-15 ENCOUNTER — TELEPHONE (OUTPATIENT)
Dept: PRIMARY CARE | Facility: CLINIC | Age: 74
End: 2024-05-15
Payer: MEDICARE

## 2024-05-15 DIAGNOSIS — E03.9 HYPOTHYROIDISM (ACQUIRED): ICD-10-CM

## 2024-05-15 RX ORDER — LEVOTHYROXINE SODIUM 112 UG/1
112 TABLET ORAL
Qty: 90 TABLET | Refills: 3 | Status: SHIPPED | OUTPATIENT
Start: 2024-05-15

## 2024-05-15 NOTE — TELEPHONE ENCOUNTER
TSH is 4.16.  Free T4 is well in the normal range at 1.36.    Plan will be to continue current dose of levothyroxine 112 mcg daily.  Refill has been sent to pharmacy.    Adán Meek MD

## 2024-09-10 ENCOUNTER — LAB (OUTPATIENT)
Dept: LAB | Facility: LAB | Age: 74
End: 2024-09-10
Payer: MEDICARE

## 2024-09-10 ENCOUNTER — APPOINTMENT (OUTPATIENT)
Dept: PRIMARY CARE | Facility: CLINIC | Age: 74
End: 2024-09-10
Payer: MEDICARE

## 2024-09-10 VITALS
OXYGEN SATURATION: 99 % | DIASTOLIC BLOOD PRESSURE: 82 MMHG | BODY MASS INDEX: 36.18 KG/M2 | WEIGHT: 231 LBS | SYSTOLIC BLOOD PRESSURE: 128 MMHG | HEART RATE: 78 BPM

## 2024-09-10 DIAGNOSIS — I87.2 CHRONIC VENOUS INSUFFICIENCY: ICD-10-CM

## 2024-09-10 DIAGNOSIS — Z86.711 HISTORY OF PULMONARY EMBOLISM: ICD-10-CM

## 2024-09-10 DIAGNOSIS — E03.9 HYPOTHYROIDISM (ACQUIRED): ICD-10-CM

## 2024-09-10 DIAGNOSIS — D50.9 IRON DEFICIENCY ANEMIA, UNSPECIFIED IRON DEFICIENCY ANEMIA TYPE: ICD-10-CM

## 2024-09-10 DIAGNOSIS — I10 ESSENTIAL HYPERTENSION: Primary | ICD-10-CM

## 2024-09-10 DIAGNOSIS — I48.0 PAROXYSMAL ATRIAL FIBRILLATION (MULTI): ICD-10-CM

## 2024-09-10 DIAGNOSIS — M48.061 SPINAL STENOSIS OF LUMBAR REGION WITHOUT NEUROGENIC CLAUDICATION: ICD-10-CM

## 2024-09-10 DIAGNOSIS — R91.8 LUNG NODULES: ICD-10-CM

## 2024-09-10 DIAGNOSIS — E78.2 MIXED HYPERLIPIDEMIA: ICD-10-CM

## 2024-09-10 DIAGNOSIS — N18.4 CKD (CHRONIC KIDNEY DISEASE) STAGE 4, GFR 15-29 ML/MIN (MULTI): ICD-10-CM

## 2024-09-10 DIAGNOSIS — M1A.0790 CHRONIC GOUT OF FOOT, UNSPECIFIED CAUSE, UNSPECIFIED LATERALITY: ICD-10-CM

## 2024-09-10 DIAGNOSIS — Z95.0 PACEMAKER: ICD-10-CM

## 2024-09-10 DIAGNOSIS — K21.9 GASTROESOPHAGEAL REFLUX DISEASE WITHOUT ESOPHAGITIS: ICD-10-CM

## 2024-09-10 LAB
T4 FREE SERPL-MCNC: 1.24 NG/DL (ref 0.78–1.48)
TSH SERPL-ACNC: 4.31 MIU/L (ref 0.44–3.98)

## 2024-09-10 PROCEDURE — 1159F MED LIST DOCD IN RCRD: CPT | Performed by: INTERNAL MEDICINE

## 2024-09-10 PROCEDURE — 3074F SYST BP LT 130 MM HG: CPT | Performed by: INTERNAL MEDICINE

## 2024-09-10 PROCEDURE — 99214 OFFICE O/P EST MOD 30 MIN: CPT | Performed by: INTERNAL MEDICINE

## 2024-09-10 PROCEDURE — 36415 COLL VENOUS BLD VENIPUNCTURE: CPT

## 2024-09-10 PROCEDURE — 3079F DIAST BP 80-89 MM HG: CPT | Performed by: INTERNAL MEDICINE

## 2024-09-10 RX ORDER — ALLOPURINOL 100 MG/1
50 TABLET ORAL
Qty: 45 TABLET | Refills: 3 | Status: SHIPPED | OUTPATIENT
Start: 2024-09-10

## 2024-09-10 RX ORDER — ROSUVASTATIN CALCIUM 10 MG/1
10 TABLET, COATED ORAL NIGHTLY
Qty: 90 TABLET | Refills: 3 | Status: SHIPPED | OUTPATIENT
Start: 2024-09-10

## 2024-09-10 NOTE — PATIENT INSTRUCTIONS
Essential hypertension  Continue nifedipine XL 90 mg daily and torsemide 10 mg daily  Maintain low-salt diet  Continue home blood pressure monitoring and bring readings to each visit.    Goal for blood pressure is under 130/80.  Contact office if blood pressure consistently above this goal.     Paroxysmal atrial fibrillation s/p ablation (8/16/2024)  Pacemaker placement (8/5/2024)  Continue Eliquis 5 mg twice a day  Follow-up with Dr. Hudson as scheduled in November      Hyperlipidemia  Coronary artery calcification  Continue rosuvastatin 10 mg daily  Continue well-balanced, low-fat/high-fiber diet  Continue annual follow-up with cardiology, Dr. Alfredo at [facility] (next appointment in 11/2024)     Chronic kidney disease (stage 4)  Secondary renal hyperparathyroidism  Vitamin D deficiency  Continue current medication regimen  Maintain adequate hydration, at least 50 ounces per day  Continue routine follow-up with [facility] nephrology, Sabrina Weber     Venous insufficiency  Maintain low-salt diet, leg elevation, and use of compression hose/socks  New prescription for support hose provided     History of pulmonary embolism  Continue long-term anticoagulation with Eliquis     Anemia of renal disease  Continue iron supplementation  Monitor expectantly     Hypothyroidism   TSH lab work to be completed today  Continue levothyroxine 112 mcg daily for now     Lung nodule/history of smoking  Continue annual CT lung cancer screening at [facility], next scheduled in 9/2024     GERD/heartburn  Continue pantoprazole 40 mg daily  Establish care with new gastroenterologist (after Dr. Mark snf) for appointment in approximately 6 months, 3/2025.  Names provided     Lumbar degenerative arthritis/stenosis with chronic low back pain  Referral to physical therapy  Continue Tylenol 500 mg, 1-2 tablets up to twice a day as needed for pain  Continue heat for 20 minutes up to 3 times a day as needed     Hyperuricemia  Continue allopurinol 50 mg  daily     Health maintenance  COVID-19, high-dose influenza, Tdap, and hepatitis B vaccines recommended (to receive at pharmacy)    Follow-up  Medicare annual wellness visit on 1/25/2025  (Fasting lab work will be ordered to complete 3 to 5 days prior to visit)

## 2024-09-10 NOTE — PROGRESS NOTES
Subjective   Patient ID: Bryan Garcia is a 73 y.o. female who presents for No chief complaint on file..    Essential hypertension  Blood pressure stable on current regimen:  nifedipine XL 90 mg daily and torsemide 10 mg daily    Paroxysmal atrial fibrillation s/p ablation (8/16/2024)  Pacemaker placement (8/5/2024)  Patient is doing well, no palpitations.  She remains on Eliquis.  No bleeding complications.  Up-to-date with Dr. Hudson.     Hyperlipidemia  Coronary artery calcification  No exertional chest pain or shortness of breath.  Patient remains on rosuvastatin.  Followed by Dr. Alfredo, [facility], next appointment in November    Chronic kidney disease (stage 4)  Secondary renal hyperparathyroidism  Vitamin D deficiency  Renal function is stable.  Patient states that she has been placed on the transplant list per [facility].     Venous insufficiency  Stable.  Continues to use compression hose/socks     History of pulmonary embolism  Long-term anticoagulation with Eliquis     Anemia of renal disease  Patient is on iron supplementation, recent CBC stable.     Hypothyroidism   Patient denies unintentional weight gain or loss, constipation or diarrhea.  TSH lab work ordered     Lung nodule/history of smoking  Annual CT lung cancer screening at [facility], next scheduled in 9/2024     GERD/heartburn  Patient is doing well on pantoprazole.  No recent dysphagia.  Previous gastroenterologist, Dr. Ramirez, has retired.  Patient has been provided names to establish care    Lumbar degenerative arthritis/stenosis with chronic low back pain  Ongoing lower back pain.  Worse with standing and walking.  No significant radicular symptoms.  Continuing to use Tylenol 1000 mg as needed.  Referral to PT provided     Health maintenance  COVID-19, high-dose influenza, Tdap, and hepatitis B vaccines recommended (to receive at pharmacy)         Review of Systems   Constitutional:  Negative for fatigue.   Eyes:  Negative for visual disturbance.    Respiratory:  Negative for shortness of breath.    Cardiovascular:  Positive for leg swelling. Negative for chest pain and palpitations.   Gastrointestinal:  Negative for blood in stool, constipation and diarrhea.   Genitourinary:  Negative for dysuria.   Musculoskeletal:  Positive for back pain.   Neurological:  Negative for dizziness, weakness and headaches.       Objective   LMP  (LMP Unknown)     Physical Exam  Vitals reviewed.   HENT:      Head: Normocephalic.      Mouth/Throat:      Mouth: Mucous membranes are moist.   Eyes:      Conjunctiva/sclera: Conjunctivae normal.   Cardiovascular:      Rate and Rhythm: Normal rate and regular rhythm.   Pulmonary:      Effort: Pulmonary effort is normal.      Breath sounds: Normal breath sounds. No rales.   Abdominal:      General: Bowel sounds are normal.      Palpations: Abdomen is soft.      Tenderness: There is no abdominal tenderness.   Musculoskeletal:      Right lower leg: Edema present.      Left lower leg: Edema present.      Comments: Back exam: No spinal or paraspinal tenderness.  Bilateral hips with intact range of motion   Skin:     General: Skin is warm.   Neurological:      General: No focal deficit present.      Mental Status: She is alert and oriented to person, place, and time.   Psychiatric:         Mood and Affect: Mood normal.         Assessment/Plan     Essential hypertension  Continue nifedipine XL 90 mg daily and torsemide 10 mg daily  Maintain low-salt diet  Continue home blood pressure monitoring and bring readings to each visit.    Goal for blood pressure is under 130/80.  Contact office if blood pressure consistently above this goal.     Paroxysmal atrial fibrillation s/p ablation (8/16/2024)  Pacemaker placement (8/5/2024)  Continue Eliquis 5 mg twice a day  Follow-up with Dr. Hudson as scheduled in November     Hyperlipidemia  Coronary artery calcification  Continue rosuvastatin 10 mg daily  Continue well-balanced, low-fat/high-fiber  diet  Continue annual follow-up with cardiology, Dr. Alfredo at [facility] (next appointment in 11/2024)     Chronic kidney disease (stage 4)  Secondary renal hyperparathyroidism  Vitamin D deficiency  Continue current medication regimen  Maintain adequate hydration, at least 50 ounces per day  Continue routine follow-up with [facility] nephrology, Sabrina Weber     Venous insufficiency  Maintain low-salt diet, leg elevation, and use of compression hose/socks  New prescription for support hose provided     History of pulmonary embolism  Continue long-term anticoagulation with Eliquis     Anemia of renal disease  Continue iron supplementation  Monitor expectantly     Hypothyroidism   TSH lab work to be completed today  Continue levothyroxine 112 mcg daily for now     Lung nodule/history of smoking  Continue annual CT lung cancer screening at [facility], next scheduled in 9/2024     GERD/heartburn  Continue pantoprazole 40 mg daily  Establish care with new gastroenterologist (after Dr. Mark residential) for appointment in approximately 6 months, 3/2025.  Names provided     Lumbar degenerative arthritis/stenosis with chronic low back pain  Referral to physical therapy  Continue Tylenol 500 mg, 1-2 tablets up to twice a day as needed for pain  Continue heat for 20 minutes up to 3 times a day as needed     Hyperuricemia  Continue allopurinol 50 mg daily     Health maintenance  COVID-19, high-dose influenza, Tdap, and hepatitis B vaccines recommended (to receive at pharmacy)    Follow-up  Medicare annual wellness visit on 1/25/2025  (Fasting lab work will be ordered to complete 3 to 5 days prior to visit)    Adán Meek MD

## 2024-09-12 ENCOUNTER — TELEPHONE (OUTPATIENT)
Dept: PRIMARY CARE | Facility: CLINIC | Age: 74
End: 2024-09-12
Payer: MEDICARE

## 2024-09-13 NOTE — TELEPHONE ENCOUNTER
Thyroid lab work including TSH and free T4 reviewed.  TSH is slightly elevated, though free T4 is normal.    I recommend patient stay on same dose of levothyroxine.    Adán Meek MD

## 2024-09-16 DIAGNOSIS — E79.0 HYPERURICEMIA: ICD-10-CM

## 2024-09-16 DIAGNOSIS — K59.09 CHRONIC CONSTIPATION: ICD-10-CM

## 2024-09-16 DIAGNOSIS — I10 ESSENTIAL HYPERTENSION: ICD-10-CM

## 2024-09-16 DIAGNOSIS — E03.9 HYPOTHYROIDISM (ACQUIRED): ICD-10-CM

## 2024-09-16 DIAGNOSIS — N18.4 CKD (CHRONIC KIDNEY DISEASE) STAGE 4, GFR 15-29 ML/MIN (MULTI): ICD-10-CM

## 2024-09-16 DIAGNOSIS — M1A.0790 CHRONIC GOUT OF FOOT, UNSPECIFIED CAUSE, UNSPECIFIED LATERALITY: Primary | ICD-10-CM

## 2024-09-16 DIAGNOSIS — E55.9 VITAMIN D DEFICIENCY: ICD-10-CM

## 2024-09-16 DIAGNOSIS — E78.2 MIXED HYPERLIPIDEMIA: ICD-10-CM

## 2024-09-16 DIAGNOSIS — E83.42 HYPOMAGNESEMIA: ICD-10-CM

## 2024-09-16 DIAGNOSIS — D50.9 IRON DEFICIENCY ANEMIA, UNSPECIFIED IRON DEFICIENCY ANEMIA TYPE: ICD-10-CM

## 2024-09-16 ASSESSMENT — ENCOUNTER SYMPTOMS
DIARRHEA: 0
DIZZINESS: 0
BLOOD IN STOOL: 0
WEAKNESS: 0
HEADACHES: 0
DYSURIA: 0
FATIGUE: 0
CONSTIPATION: 0
PALPITATIONS: 0
SHORTNESS OF BREATH: 0
BACK PAIN: 1

## 2024-12-19 ENCOUNTER — TELEPHONE (OUTPATIENT)
Dept: PRIMARY CARE | Facility: CLINIC | Age: 74
End: 2024-12-19
Payer: MEDICARE

## 2024-12-19 DIAGNOSIS — E03.9 HYPOTHYROIDISM (ACQUIRED): ICD-10-CM

## 2024-12-19 DIAGNOSIS — E83.42 HYPOMAGNESEMIA: ICD-10-CM

## 2024-12-19 DIAGNOSIS — E79.0 HYPERURICEMIA: ICD-10-CM

## 2024-12-19 DIAGNOSIS — E78.2 MIXED HYPERLIPIDEMIA: ICD-10-CM

## 2024-12-19 DIAGNOSIS — E55.9 VITAMIN D DEFICIENCY: ICD-10-CM

## 2024-12-19 DIAGNOSIS — N18.4 CKD (CHRONIC KIDNEY DISEASE) STAGE 4, GFR 15-29 ML/MIN (MULTI): ICD-10-CM

## 2024-12-19 DIAGNOSIS — R73.9 HYPERGLYCEMIA: ICD-10-CM

## 2024-12-19 DIAGNOSIS — D50.9 IRON DEFICIENCY ANEMIA, UNSPECIFIED IRON DEFICIENCY ANEMIA TYPE: ICD-10-CM

## 2024-12-19 DIAGNOSIS — M1A.0790 CHRONIC GOUT OF FOOT, UNSPECIFIED CAUSE, UNSPECIFIED LATERALITY: Primary | ICD-10-CM

## 2024-12-19 DIAGNOSIS — K59.09 CHRONIC CONSTIPATION: ICD-10-CM

## 2024-12-19 DIAGNOSIS — I10 ESSENTIAL HYPERTENSION: ICD-10-CM

## 2024-12-19 DIAGNOSIS — K21.9 GASTROESOPHAGEAL REFLUX DISEASE WITHOUT ESOPHAGITIS: ICD-10-CM

## 2024-12-19 NOTE — TELEPHONE ENCOUNTER
Patricia @ [facility] called to let you know that she came out to do Bryan's wellness visit and her HbA1c is 6.9%.  If you'd like to speak with her, please call [phone].  Thank you

## 2024-12-20 NOTE — TELEPHONE ENCOUNTER
Noted.  Patient has MWV in 1 month and will be getting labs done prior to include repeat A1c.    Please notify patient to complete labs prior to visit.    Adán Meek MD

## 2025-01-20 ENCOUNTER — LAB (OUTPATIENT)
Dept: LAB | Facility: LAB | Age: 75
End: 2025-01-20
Payer: MEDICARE

## 2025-01-20 DIAGNOSIS — D50.9 IRON DEFICIENCY ANEMIA, UNSPECIFIED IRON DEFICIENCY ANEMIA TYPE: ICD-10-CM

## 2025-01-20 DIAGNOSIS — E83.42 HYPOMAGNESEMIA: ICD-10-CM

## 2025-01-20 DIAGNOSIS — E78.2 MIXED HYPERLIPIDEMIA: ICD-10-CM

## 2025-01-20 DIAGNOSIS — N18.4 CKD (CHRONIC KIDNEY DISEASE) STAGE 4, GFR 15-29 ML/MIN (MULTI): ICD-10-CM

## 2025-01-20 DIAGNOSIS — R73.9 HYPERGLYCEMIA: ICD-10-CM

## 2025-01-20 DIAGNOSIS — E55.9 VITAMIN D DEFICIENCY: ICD-10-CM

## 2025-01-20 DIAGNOSIS — E79.0 HYPERURICEMIA: ICD-10-CM

## 2025-01-20 DIAGNOSIS — E03.9 HYPOTHYROIDISM (ACQUIRED): ICD-10-CM

## 2025-01-20 LAB
25(OH)D3 SERPL-MCNC: 38 NG/ML (ref 30–100)
ALBUMIN SERPL BCP-MCNC: 3.7 G/DL (ref 3.4–5)
ALP SERPL-CCNC: 122 U/L (ref 33–136)
ALT SERPL W P-5'-P-CCNC: 11 U/L (ref 7–45)
ANION GAP SERPL CALC-SCNC: 12 MMOL/L (ref 10–20)
AST SERPL W P-5'-P-CCNC: 17 U/L (ref 9–39)
BASOPHILS # BLD AUTO: 0.05 X10*3/UL (ref 0–0.1)
BASOPHILS NFR BLD AUTO: 1 %
BILIRUB SERPL-MCNC: 0.6 MG/DL (ref 0–1.2)
BUN SERPL-MCNC: 25 MG/DL (ref 6–23)
CALCIUM SERPL-MCNC: 9.2 MG/DL (ref 8.6–10.6)
CHLORIDE SERPL-SCNC: 109 MMOL/L (ref 98–107)
CHOLEST SERPL-MCNC: 126 MG/DL (ref 0–199)
CHOLESTEROL/HDL RATIO: 2.5
CO2 SERPL-SCNC: 28 MMOL/L (ref 21–32)
CREAT SERPL-MCNC: 1.56 MG/DL (ref 0.5–1.05)
EGFRCR SERPLBLD CKD-EPI 2021: 35 ML/MIN/1.73M*2
EOSINOPHIL # BLD AUTO: 0.1 X10*3/UL (ref 0–0.4)
EOSINOPHIL NFR BLD AUTO: 1.9 %
ERYTHROCYTE [DISTWIDTH] IN BLOOD BY AUTOMATED COUNT: 14.9 % (ref 11.5–14.5)
EST. AVERAGE GLUCOSE BLD GHB EST-MCNC: 111 MG/DL
GLUCOSE SERPL-MCNC: 90 MG/DL (ref 74–99)
HBA1C MFR BLD: 5.5 %
HCT VFR BLD AUTO: 39.8 % (ref 36–46)
HDLC SERPL-MCNC: 50.4 MG/DL
HGB BLD-MCNC: 12.9 G/DL (ref 12–16)
IMM GRANULOCYTES # BLD AUTO: 0.02 X10*3/UL (ref 0–0.5)
IMM GRANULOCYTES NFR BLD AUTO: 0.4 % (ref 0–0.9)
LDLC SERPL CALC-MCNC: 62 MG/DL
LYMPHOCYTES # BLD AUTO: 1.41 X10*3/UL (ref 0.8–3)
LYMPHOCYTES NFR BLD AUTO: 27.2 %
MAGNESIUM SERPL-MCNC: 2.14 MG/DL (ref 1.6–2.4)
MCH RBC QN AUTO: 26.1 PG (ref 26–34)
MCHC RBC AUTO-ENTMCNC: 32.4 G/DL (ref 32–36)
MCV RBC AUTO: 81 FL (ref 80–100)
MONOCYTES # BLD AUTO: 0.5 X10*3/UL (ref 0.05–0.8)
MONOCYTES NFR BLD AUTO: 9.7 %
NEUTROPHILS # BLD AUTO: 3.1 X10*3/UL (ref 1.6–5.5)
NEUTROPHILS NFR BLD AUTO: 59.8 %
NON HDL CHOLESTEROL: 76 MG/DL (ref 0–149)
NRBC BLD-RTO: 0 /100 WBCS (ref 0–0)
PLATELET # BLD AUTO: 211 X10*3/UL (ref 150–450)
POTASSIUM SERPL-SCNC: 4.3 MMOL/L (ref 3.5–5.3)
PROT SERPL-MCNC: 6.8 G/DL (ref 6.4–8.2)
RBC # BLD AUTO: 4.94 X10*6/UL (ref 4–5.2)
SODIUM SERPL-SCNC: 145 MMOL/L (ref 136–145)
TRIGL SERPL-MCNC: 69 MG/DL (ref 0–149)
TSH SERPL-ACNC: 0.48 MIU/L (ref 0.44–3.98)
URATE SERPL-MCNC: 8.1 MG/DL (ref 2.3–6.7)
VLDL: 14 MG/DL (ref 0–40)
WBC # BLD AUTO: 5.2 X10*3/UL (ref 4.4–11.3)

## 2025-01-21 LAB — PTH-INTACT SERPL-MCNC: 147.4 PG/ML (ref 18.5–88)

## 2025-01-24 ENCOUNTER — APPOINTMENT (OUTPATIENT)
Dept: PRIMARY CARE | Facility: CLINIC | Age: 75
End: 2025-01-24
Payer: MEDICARE

## 2025-01-24 VITALS
DIASTOLIC BLOOD PRESSURE: 80 MMHG | WEIGHT: 239 LBS | SYSTOLIC BLOOD PRESSURE: 130 MMHG | HEIGHT: 67 IN | HEART RATE: 69 BPM | BODY MASS INDEX: 37.51 KG/M2 | OXYGEN SATURATION: 97 %

## 2025-01-24 DIAGNOSIS — Z12.31 BREAST CANCER SCREENING BY MAMMOGRAM: ICD-10-CM

## 2025-01-24 DIAGNOSIS — I48.0 PAROXYSMAL ATRIAL FIBRILLATION (MULTI): ICD-10-CM

## 2025-01-24 DIAGNOSIS — N18.9 ANEMIA OF RENAL DISEASE: ICD-10-CM

## 2025-01-24 DIAGNOSIS — E79.0 HYPERURICEMIA: ICD-10-CM

## 2025-01-24 DIAGNOSIS — E03.9 HYPOTHYROIDISM (ACQUIRED): ICD-10-CM

## 2025-01-24 DIAGNOSIS — I87.2 CHRONIC VENOUS INSUFFICIENCY: ICD-10-CM

## 2025-01-24 DIAGNOSIS — N25.81 SECONDARY RENAL HYPERPARATHYROIDISM (MULTI): ICD-10-CM

## 2025-01-24 DIAGNOSIS — D63.1 ANEMIA OF RENAL DISEASE: ICD-10-CM

## 2025-01-24 DIAGNOSIS — H93.11 RIGHT-SIDED TINNITUS: ICD-10-CM

## 2025-01-24 DIAGNOSIS — I25.10 CORONARY ARTERY CALCIFICATION: ICD-10-CM

## 2025-01-24 DIAGNOSIS — E78.2 MIXED HYPERLIPIDEMIA: ICD-10-CM

## 2025-01-24 DIAGNOSIS — E55.9 VITAMIN D DEFICIENCY: ICD-10-CM

## 2025-01-24 DIAGNOSIS — R10.31 RIGHT LOWER QUADRANT PAIN: ICD-10-CM

## 2025-01-24 DIAGNOSIS — I10 ESSENTIAL HYPERTENSION: ICD-10-CM

## 2025-01-24 DIAGNOSIS — N18.4 CKD (CHRONIC KIDNEY DISEASE) STAGE 4, GFR 15-29 ML/MIN (MULTI): ICD-10-CM

## 2025-01-24 DIAGNOSIS — Z95.0 PACEMAKER: ICD-10-CM

## 2025-01-24 DIAGNOSIS — E83.42 HYPOMAGNESEMIA: ICD-10-CM

## 2025-01-24 DIAGNOSIS — Z00.00 MEDICARE ANNUAL WELLNESS VISIT, SUBSEQUENT: Primary | ICD-10-CM

## 2025-01-24 DIAGNOSIS — H72.01 CENTRAL PERFORATION OF TYMPANIC MEMBRANE, RIGHT: ICD-10-CM

## 2025-01-24 DIAGNOSIS — M1A.0790 CHRONIC GOUT OF FOOT, UNSPECIFIED CAUSE, UNSPECIFIED LATERALITY: ICD-10-CM

## 2025-01-24 DIAGNOSIS — K63.5 HYPERPLASTIC POLYP OF LARGE INTESTINE: ICD-10-CM

## 2025-01-24 DIAGNOSIS — K21.9 GASTROESOPHAGEAL REFLUX DISEASE WITHOUT ESOPHAGITIS: ICD-10-CM

## 2025-01-24 DIAGNOSIS — G47.33 OBSTRUCTIVE SLEEP APNEA: ICD-10-CM

## 2025-01-24 PROBLEM — H93.13 BILATERAL TINNITUS: Status: RESOLVED | Noted: 2024-01-14 | Resolved: 2025-01-24

## 2025-01-24 RX ORDER — TORSEMIDE 10 MG/1
10 TABLET ORAL DAILY
Qty: 90 TABLET | Refills: 3 | Status: SHIPPED | OUTPATIENT
Start: 2025-01-24

## 2025-01-24 RX ORDER — PANTOPRAZOLE SODIUM 40 MG/1
40 TABLET, DELAYED RELEASE ORAL
Qty: 90 TABLET | Refills: 3 | Status: SHIPPED | OUTPATIENT
Start: 2025-01-24

## 2025-01-24 ASSESSMENT — ENCOUNTER SYMPTOMS
DEPRESSION: 0
HEMATURIA: 0
BACK PAIN: 0
SHORTNESS OF BREATH: 0
CONSTIPATION: 0
LOSS OF SENSATION IN FEET: 0
INSOMNIA: 0
DYSPNEA ON EXERTION: 0
ABDOMINAL PAIN: 1
HEADACHES: 0
DIZZINESS: 0
VOMITING: 0
COUGH: 0
HEARTBURN: 0
DYSURIA: 0
OCCASIONAL FEELINGS OF UNSTEADINESS: 0
NERVOUS/ANXIOUS: 0
DIARRHEA: 0
FEVER: 0
NAUSEA: 0

## 2025-01-24 NOTE — PATIENT INSTRUCTIONS
Medicare annual wellness visit (subsequent)  Continued walking and home/using leg cycle daily recommended  Well-balanced diet rich in vegetables, fruit, fiber recommended  COVID-19 booster vaccine due in March (to receive at pharmacy)  Advanced directive form provided to patient for completion  Continued routine follow-up with Dr. Murcia, ophthalmology, recommended  Screening mammogram ordered, due on/after 4/4/2025    Essential hypertension  Continue nifedipine XL 90 mg daily and torsemide 10 mg daily  Maintain low-salt diet  Continue home blood pressure monitoring and bring readings to each visit.    Goal for blood pressure is under 130/80.       Paroxysmal atrial fibrillation s/p ablation (8/16/2024)  Pacemaker placement (8/5/2024)  Continue Eliquis 5 mg twice a day  Follow-up with Dr. Hudson     Hyperlipidemia  Coronary artery calcification  Continue rosuvastatin 10 mg daily  Continue well-balanced, low-fat/high-fiber diet  Continue annual follow-up with cardiology, Dr. Alfredo at [facility]      Chronic kidney disease (stage 4)  Secondary renal hyperparathyroidism  Vitamin D deficiency  Continue current medication regimen  Maintain adequate hydration, at least 50 ounces per day  Continue routine follow-up with [facility] nephrology, Sabrina Weber (next appointment in March) and [facility] transplant team annually, next due in September     Venous insufficiency  Maintain low-salt diet, leg elevation, and use of compression hose/socks  Referral to establish care with  vascular medicine provided     History of pulmonary embolism  Continue long-term anticoagulation with Eliquis     Anemia of renal disease (blood count stable)  Continue iron supplementation  Monitor expectantly     Hypothyroidism   Continue levothyroxine 112 mcg daily     Lung nodule/history of smoking  Continue lung cancer screening at [facility].  Patient to discuss at upcoming appointment whether CT scanning is due.    Obstructive sleep apnea (new diagnosis on sleep  study 1/10)  Upcoming appointment with sleep medicine/pulmonary medicine scheduled to discuss treatment, CPAP     GERD/heartburn  Continue pantoprazole 40 mg daily  Establish care with new gastroenterologist, Dr. Shaina Siegel.  Call [phone] to schedule     Right lower quadrant abdominal pain (chronic)  CT of abdomen/pelvis ordered  Schedule appointment with gastroenterology    History of colon polyps  Next screening colonoscopy due in 3/2029    Hyperuricemia (uric acid level elevated at 8.1)  Continue allopurinol 50 mg daily  Check uric acid level at next visit    Right tympanic membrane perforation (chronic)  Right-sided tinnitus  Referral to ENT    Follow-up  1.  Office visit in May  (Lab work ordered to complete prior to visit)  2.  Medicare annual wellness visit in 1 year, on/after 1/25/2026

## 2025-01-24 NOTE — PROGRESS NOTES
Bryan Garcia is a 74 y.o. year old here for a Medicare Annual Wellness Visit     Health Risk Assessment  In general, health is:  Good     Concerns with balance:  No     Concerns with teeth or dentures:  No     Concerns with sexual function:  No     Felt anxious, stressed, angry, irritable, lonely, isolated, or had thoughts of hurting themself:  No     Has little interest or pleasure in doing things:  No     Bothered by feeling down, depressed, or hopeless:  No     Needs help with grocery shopping, cooking, housework, bathing, grooming, dressing, eating, sitting or standing, walking, using the toilet, handling finances, taking medications, using the telephone, or driving:  No     Following safety precautions in the home environment and vehicle: removed throw rugs from floors, installed grab bars in the bathroom, handrails in stairwells, having adequate lighting, wearing seatbelt at all times?:  Yes     Smokes cigarettes, vapes, or chew tobacco:  No     Eats healthy foods including fruits, vegetables, whole grains, and fiber-rich foods:  Daily     Number of days per week engages in exercise:  2-3 days     Average alcohol consumption: No        Current Providers  Specialists: I have reviewed specialist-related care of the patient in the medical record.     Medical/Family history review  Reviewed and updated problem list, medical/surgical/family/social history, medications, and allergies.     Opioid use review  Patient use of opioids:  No           Depression screening  Depression Screening PHQ-2 Score   2/14/2023 0         Depression screening tool completed and reviewed. Based on score and interview, patient is not at risk for depression. Screening tool discussed with patient, and I recommended no further intervention at this time.     Cognitive screening  Mini Cog Score:  5/5     Cognitive screening reviewed and plan:  Yes     Functional Observation  Was the patient's timed Up & Go test unsteady or >= 12  "seconds?  No     Advance Care Planning  End of Life planning discussed, including patient's advanced directive wishes:  Yes    Vision and Hearing assessment  Visual acuity (required for Welcome to Medicare):  Ophthalmology  Hearing Evaluation:  Yes    ====================================================    /80 (BP Location: Left arm, Patient Position: Sitting, BP Cuff Size: Adult)   Pulse 69   Ht 1.689 m (5' 6.5\")   Wt 108 kg (239 lb)   LMP  (LMP Unknown)   SpO2 97%   BMI 38.00 kg/m²     Chief Complaint   Patient presents with    Medicare Annual Wellness Visit Subsequent     Mini-cog score 5/5       Office visit for chronic medical conditions    Essential hypertension  Blood pressure is at goal on current medication.    Paroxysmal atrial fibrillation s/p ablation (8/16/2024)  Pacemaker placement (8/5/2024)  Patient denies any palpitations, shortness of breath, chest pain.  No bleeding complications on anticoagulation therapy.     Hyperlipidemia  Coronary artery calcification  Lipid panel reviewed and values are at goal.  Patient walking around home for exercise.  Denies any angina, palpitations.  [facility]      Chronic kidney disease (stage 4)  Secondary renal hyperparathyroidism  Vitamin D deficiency  Creatinine 1.56, below baseline.  [facility] and transplant team continue to follow patient.  Patient is seen annually by transplant team, last in 9/2024.  She is on she is currently on the inactive list.  PTH is 147.  Vitamin D 38.    Venous insufficiency with chronic lower extremity edema  Patient continues to use support hose.  Has not seen vascular medicine since Dr. Linton at [facility] sometime ago.  Placing on support hose is challenging at times.     History of pulmonary embolism  Patient remains on lifelong Eliquis anticoagulation     Anemia of renal disease/history of iron deficiency anemia  Patient remains on iron supplement, blood count is stable   "   Hypothyroidism   TSH is at goal.     Lung nodule/history of smoking  Patient monitored by pulmonary lung cancer screening/nodule clinic at [facility].  Last CT chest over 1 year ago in 9/2023.  Patient has upcoming appointment to discuss whether continued surveillance is recommended.  No smoking since 2009.    Obstructive sleep apnea  Titration sleep study completed on 1/10 at [facility].  Patient with moderate obstructive sleep apnea, responsive to CPAP.  Patient states she tolerated treatment.    GERD/heartburn  Patient doing well on pantoprazole.  Denies any dysphagia.  No breakthrough symptoms.  She needs to establish with new gastroenterologist after Dr. Mark [facility]    Right lower quadrant abdominal pain  Patient notes right lower abdominal pain which she states has been present for perhaps 1 year.  Pain is worse when standing or walking.  She denies any associated nausea, vomiting, anorexia, unintentional weight loss, melena, bright red blood per rectum, back pain, hematuria or dysuria.    History of colon polyps  Last screening colonoscopy in 3/2024 with 3 hyperplastic polyps removed.  Next screening colonoscopy due in 3/2029    Hyperuricemia (uric acid level elevated at 8.1)  Patient is currently on allopurinol 50 mg daily    Right tympanic membrane perforation (chronic)  Right-sided tinnitus  Referral to ENT discussed    Health maintenance  Exercise: Walking and home for up to 20 minutes most days.  Chair exercise cycle twice a week.  Ophthalmology: Dr. Murcia, [facility]  Dentistry: Patient has dentures  Dermatology: No         Review of Systems   Constitutional: Negative for fever and malaise/fatigue.   HENT:  Positive for hearing loss and tinnitus.    Eyes:  Negative for visual disturbance.   Cardiovascular:  Negative for chest pain and dyspnea on exertion.   Respiratory:  Negative for cough and shortness of breath.    Skin:  Negative for rash.   Musculoskeletal:  Negative for arthritis, back pain and muscle  weakness.   Gastrointestinal:  Positive for abdominal pain. Negative for constipation, diarrhea, heartburn, nausea and vomiting.   Genitourinary:  Negative for dysuria, hematuria and nocturia.   Neurological:  Negative for dizziness and headaches.   Psychiatric/Behavioral:  Negative for depression. The patient does not have insomnia and is not nervous/anxious.         Physical Exam  Vitals reviewed.   HENT:      Right Ear: Tympanic membrane normal.      Left Ear: Tympanic membrane normal.      Mouth/Throat:      Mouth: Mucous membranes are moist.   Eyes:      Conjunctiva/sclera: Conjunctivae normal.   Cardiovascular:      Rate and Rhythm: Normal rate and regular rhythm.   Pulmonary:      Effort: Pulmonary effort is normal.      Breath sounds: Normal breath sounds. No rales.   Abdominal:      General: Bowel sounds are normal.      Palpations: Abdomen is soft.      Tenderness: There is no abdominal tenderness.   Genitourinary:     Comments: Breast exam: Normal nipples, no masses or axillary lymphadenopathy bilaterally.    Skin:     General: Skin is warm.   Neurological:      General: No focal deficit present.      Mental Status: She is alert and oriented to person, place, and time.       Assessment and Plan    Medicare annual wellness visit (subsequent)  Continued walking and home/using leg cycle daily recommended  Well-balanced diet rich in vegetables, fruit, fiber recommended  COVID-19 booster vaccine due in March (to receive at pharmacy)  Advanced directive form provided to patient for completion  Continued routine follow-up with Dr. Murcia, ophthalmology, recommended  Screening mammogram ordered, due on/after 4/4/2025    Essential hypertension  Continue nifedipine XL 90 mg daily and torsemide 10 mg daily  Maintain low-salt diet  Continue home blood pressure monitoring and bring readings to each visit.    Goal for blood pressure is under 130/80.       Paroxysmal atrial fibrillation s/p ablation  (8/16/2024)  Pacemaker placement (8/5/2024)  Continue Eliquis 5 mg twice a day  Follow-up with Dr. Hudson     Hyperlipidemia  Coronary artery calcification  Continue rosuvastatin 10 mg daily  Continue well-balanced, low-fat/high-fiber diet  Continue annual follow-up with cardiology, Dr. Alfredo at [facility]      Chronic kidney disease (stage 4)  Secondary renal hyperparathyroidism  Vitamin D deficiency  Continue current medication regimen  Maintain adequate hydration, at least 50 ounces per day  Continue routine follow-up with [facility] nephrology, Sabrina Weber (next appointment in March) and [facility] transplant team annually, next due in September     Venous insufficiency  Maintain low-salt diet, leg elevation, and use of compression hose/socks  Referral to establish care with  vascular medicine provided     History of pulmonary embolism  Continue long-term anticoagulation with Eliquis     Anemia of renal disease (blood count stable)  Continue iron supplementation  Monitor expectantly     Hypothyroidism   Continue levothyroxine 112 mcg daily     Lung nodule/history of smoking  Continue lung cancer screening at [facility].  Patient to discuss at upcoming appointment whether CT scanning is due.    Obstructive sleep apnea (new diagnosis on sleep study 1/10)  Upcoming appointment with sleep medicine/pulmonary medicine scheduled to discuss treatment, CPAP     GERD/heartburn  Continue pantoprazole 40 mg daily  Establish care with new gastroenterologist, Dr. Shaina Siegel.  Call [phone] to schedule     Right lower quadrant abdominal pain (chronic)  CT of abdomen/pelvis ordered  Schedule appointment with gastroenterology    History of colon polyps  Next screening colonoscopy due in 3/2029    Hyperuricemia (uric acid level elevated at 8.1)  Continue allopurinol 50 mg daily  Check uric acid level at next visit    Right tympanic membrane perforation (chronic)  Right-sided tinnitus  Referral to ENT    Follow-up  1.  Office visit in May  (Lab  work ordered to complete prior to visit)  2.  Medicare annual wellness visit in 1 year, on/after 1/25/2026    Adán Meek MD

## 2025-01-24 NOTE — LETTER
2025      Sabrina Weber PA-C  [facility] nephrology    Re: Bryan Garcia ( 1950)    Dear Sabrina:    I saw our mutual patient today for preventative visit/follow-up visit.  Overall, she is doing fairly well.  Lab work completed at  for visit notable for stable creatinine.  Vitamin D is borderline low at 38 and PTH is elevated at 147.  I see that PTH was in similar range at last visit with you in September.    Bryan has an appointment with you first week of March, but thought I would update you with recent labs in case any adjustments were to be made with her medications.    Thank you and I hope all is well.      Sincerely,        Adán Meek MD  [address], [place] [address]    Phone:  [phone]  Fax:  [phone]

## 2025-01-28 ENCOUNTER — HOSPITAL ENCOUNTER (OUTPATIENT)
Dept: RADIOLOGY | Facility: CLINIC | Age: 75
Discharge: HOME | End: 2025-01-28
Payer: MEDICARE

## 2025-01-28 DIAGNOSIS — R10.31 RIGHT LOWER QUADRANT PAIN: ICD-10-CM

## 2025-01-28 PROCEDURE — 74176 CT ABD & PELVIS W/O CONTRAST: CPT | Performed by: RADIOLOGY

## 2025-01-28 PROCEDURE — 74176 CT ABD & PELVIS W/O CONTRAST: CPT

## 2025-02-15 ENCOUNTER — TELEPHONE (OUTPATIENT)
Dept: PRIMARY CARE | Facility: CLINIC | Age: 75
End: 2025-02-15
Payer: MEDICARE

## 2025-02-15 DIAGNOSIS — R91.8 LUNG NODULES: Primary | ICD-10-CM

## 2025-02-15 DIAGNOSIS — M54.50 CHRONIC MIDLINE LOW BACK PAIN WITHOUT SCIATICA: ICD-10-CM

## 2025-02-15 DIAGNOSIS — G89.29 CHRONIC MIDLINE LOW BACK PAIN WITHOUT SCIATICA: ICD-10-CM

## 2025-02-15 NOTE — TELEPHONE ENCOUNTER
CT of the abdomen and pelvis done to evaluate chronic diffuse, mostly right lower quadrant abdominal pain.    No worrisome findings on CT of abdomen/pelvis  Ventral hernia, not with incarceration, etc. and not likely cause of symptoms.  Incidental finding of right lower lung nodule.  Please note, patient has previously been followed at [facility] and lung cancer screening clinic and has had lung nodule seen.  Most recent CT lung at [facility] was in 2022.    In discussing symptoms with patient today, she has chronic intermittent primarily right lower quadrant pain, at times worse with standing and associated with right lower back pain.    I suspect that current pain is referred from lumbar spine which has previously been evaluated by CT lumbar spine in 2021 (at [facility]) notable for degenerative arthritis/disc disorder/spinal stenosis.    Plan:  1.  X-ray lumbar spine  2.  Referral to physical therapy  3.  If pain persists after PT, consider referral to pain management (possibly doing CT lumbar spine prior to consult, patient with pacemaker).  4.  CT chest in 6 months for surveillance of right lung nodule, July 2025    Patient agreeable with above plan.    Adán Meek MD

## 2025-02-18 ENCOUNTER — APPOINTMENT (OUTPATIENT)
Dept: OTOLARYNGOLOGY | Facility: CLINIC | Age: 75
End: 2025-02-18
Payer: MEDICARE

## 2025-02-25 ENCOUNTER — APPOINTMENT (OUTPATIENT)
Dept: OTOLARYNGOLOGY | Facility: CLINIC | Age: 75
End: 2025-02-25
Payer: MEDICARE

## 2025-03-21 ENCOUNTER — HOSPITAL ENCOUNTER (OUTPATIENT)
Dept: RADIOLOGY | Facility: CLINIC | Age: 75
Discharge: HOME | End: 2025-03-21
Payer: MEDICARE

## 2025-03-21 DIAGNOSIS — G89.29 CHRONIC MIDLINE LOW BACK PAIN WITHOUT SCIATICA: ICD-10-CM

## 2025-03-21 DIAGNOSIS — M54.50 CHRONIC MIDLINE LOW BACK PAIN WITHOUT SCIATICA: ICD-10-CM

## 2025-03-21 PROCEDURE — 72110 X-RAY EXAM L-2 SPINE 4/>VWS: CPT

## 2025-03-23 ENCOUNTER — TELEPHONE (OUTPATIENT)
Dept: PRIMARY CARE | Facility: CLINIC | Age: 75
End: 2025-03-23
Payer: MEDICARE

## 2025-03-23 NOTE — TELEPHONE ENCOUNTER
Patient and I reviewed lumbar spine x-ray results, again notable for degenerative arthritis.  No other new or different findings.    Patient is aware to schedule physical therapy and she will call tomorrow.    Patient is aware to contact me if physical therapy does not improve her symptoms at which time we will consider referral to pain medicine (with likely CT lumbar spine prior to visit.  MRI cannot be done due to pacemaker).    Adán Meek MD     Yes - the patient is able to be screened

## 2025-03-25 ENCOUNTER — APPOINTMENT (OUTPATIENT)
Dept: AUDIOLOGY | Facility: CLINIC | Age: 75
End: 2025-03-25
Payer: MEDICARE

## 2025-03-25 ENCOUNTER — APPOINTMENT (OUTPATIENT)
Dept: OTOLARYNGOLOGY | Facility: CLINIC | Age: 75
End: 2025-03-25
Payer: MEDICARE

## 2025-03-25 VITALS — HEIGHT: 67 IN | BODY MASS INDEX: 37.51 KG/M2 | WEIGHT: 239 LBS | TEMPERATURE: 97 F

## 2025-03-25 DIAGNOSIS — H93.11 TINNITUS OF RIGHT EAR: ICD-10-CM

## 2025-03-25 DIAGNOSIS — H72.01 CENTRAL PERFORATION OF TYMPANIC MEMBRANE, RIGHT: ICD-10-CM

## 2025-03-25 DIAGNOSIS — H90.A11 CONDUCTIVE HEARING LOSS OF RIGHT EAR WITH RESTRICTED HEARING OF LEFT EAR: ICD-10-CM

## 2025-03-25 DIAGNOSIS — H93.11 RIGHT-SIDED TINNITUS: ICD-10-CM

## 2025-03-25 DIAGNOSIS — H72.91 PERFORATION OF RIGHT TYMPANIC MEMBRANE: ICD-10-CM

## 2025-03-25 DIAGNOSIS — H93.13 TINNITUS OF BOTH EARS: ICD-10-CM

## 2025-03-25 DIAGNOSIS — H90.A31 MIXED CONDUCTIVE AND SENSORINEURAL HEARING LOSS OF RIGHT EAR WITH RESTRICTED HEARING OF LEFT EAR: Primary | ICD-10-CM

## 2025-03-25 DIAGNOSIS — H90.3 SENSORINEURAL HEARING LOSS (SNHL) OF BOTH EARS: ICD-10-CM

## 2025-03-25 DIAGNOSIS — H60.8X1 CHRONIC ECZEMATOUS OTITIS EXTERNA OF RIGHT EAR: Primary | ICD-10-CM

## 2025-03-25 PROCEDURE — 3008F BODY MASS INDEX DOCD: CPT | Performed by: OTOLARYNGOLOGY

## 2025-03-25 PROCEDURE — 1160F RVW MEDS BY RX/DR IN RCRD: CPT | Performed by: OTOLARYNGOLOGY

## 2025-03-25 PROCEDURE — 99204 OFFICE O/P NEW MOD 45 MIN: CPT | Performed by: OTOLARYNGOLOGY

## 2025-03-25 PROCEDURE — 92567 TYMPANOMETRY: CPT

## 2025-03-25 PROCEDURE — 1159F MED LIST DOCD IN RCRD: CPT | Performed by: OTOLARYNGOLOGY

## 2025-03-25 PROCEDURE — 1036F TOBACCO NON-USER: CPT | Performed by: OTOLARYNGOLOGY

## 2025-03-25 PROCEDURE — 92557 COMPREHENSIVE HEARING TEST: CPT

## 2025-03-25 RX ORDER — MOMETASONE FUROATE 1 MG/G
CREAM TOPICAL DAILY PRN
Qty: 15 G | Refills: 3 | Status: SHIPPED | OUTPATIENT
Start: 2025-03-25

## 2025-03-25 ASSESSMENT — ENCOUNTER SYMPTOMS
EYE ITCHING: 1
FATIGUE: 1
SHORTNESS OF BREATH: 1

## 2025-03-25 NOTE — PROGRESS NOTES
Subjective   Patient ID: Bryan Garcia is a 74 y.o. female  HPI  Patient is complaining of a chronic history of bilateral nonpulsatile tinnitus.  She has no otalgia and no otorrhea and no vertigo.  She does complain of chronic itching in her right ear on the right side.  She has not noticed any sudden change in her hearing.    Review of Systems   Constitutional:  Positive for fatigue.   HENT:  Positive for postnasal drip and tinnitus.         Itchy ears   Eyes:  Positive for itching.        Blurred vision   Respiratory:  Positive for shortness of breath.    Cardiovascular:  Positive for leg swelling.        Irregular heartbeat       Objective   Physical Exam  The following elements of a brief ear nose and throat exam were performed: External ear canals and tympanic membranes, external nose and nasal passages, oral cavity, palpation of the neck, percussion of the face, palpation of the thyroid.    There is dry flaking of the skin in the ear canal noted on the right side.  There is a small perforation of the right tympanic membrane noted.  The left tympanic membrane is clear and mobile..  There is clinical evidence of hearing loss noted during conversation and she is unable to hear finger rub bilaterally.  The remainder of her exam was within normal limits.  An audiogram and tympanogram were ordered obtained and results were reviewed today and reveal bilateral mild to severe downsloping sensorineural hearing loss with air-bone gap on the right side.  This was confirmed using tuning fork at 500 Hz and at 1000 Hz.    Assessment/Plan   Diagnoses and all orders for this visit:  Chronic eczematous otitis externa of right ear (Primary)  -     mometasone (Elocon) 0.1 % cream; Apply topically once daily as needed (For itching).  Right-sided tinnitus  -     Referral to ENT  Central perforation of tympanic membrane, right  -     Referral to ENT  Sensorineural hearing loss (SNHL) of both ears  -     Tympanometry Only;  Future  -     Comprehensive hearing test; Future  Tinnitus of both ears  -     Tympanometry Only; Future  -     Comprehensive hearing test; Future  Conductive hearing loss of right ear with restricted hearing of left ear  Perforation of right tympanic membrane     1.  Chronic eczema of the right ear canal.  The patient was started on mometasone cream.  2.  Bilateral mild to severe downsloping sensorineural hearing loss with small air-bone gap on the right side secondary to small perforation of the right tympanic membrane.  The patient was medically cleared for the use of hearing aids.  A repeat hearing test will be obtained in 6 months.

## 2025-03-25 NOTE — PROGRESS NOTES
AUDIOLOGIC EVALUATION    Name:  Bryan Garcia  :  1950  Age:  74 y.o.    HISTORY:     Patient reported possible decrease in her hearing, as she sometimes has to ask people to repeat themselves.  She also endorsed history of right-sided tinnitus for many years.  She denied otalgia, aural pressure/fullness, otorrhea, dizziness, and prior ear surgeries.    EVALUATION:    See Audiogram.    RESULTS:    Otoscopy:  Right: Clear canal, perforation of the tympanic membrane present.  Left: Clear canal, normal color and appearance of tympanic membrane.    Tympanometry:  Right: Could not maintain seal due to perforation.  Left: Normal tympanic membrane mobility and middle ear pressure.    Hearing Sensitivity:  Right: Hearing within normal limits through 500 Hz, sloping to moderately severe mixed hearing loss.  Left: Hearing within normal limits through 1000 Hz, precipitously sloping to moderately severe sensorineural hearing loss.    Word Recognition Score (NU-6 ordered 1-2):  Right: Good (84%) at 70 dBHL.  Left: Excellent (90%) at 60 dBHL.    ASSESSMENT AND PLAN:    - Continue medical follow-up with Dr. Yuen.  - Consider further evaluation/monitoring of asymmetric/mixed hearing loss and unilateral tinnitus.  - Recommended hearing needs assessment to discuss management of hearing loss and tinnitus.  - Counseled regarding tinnitus and tinnitus management strategies.  - Monitor and recheck hearing as warranted.  - Counseled regarding results and recommendations.      Kaylie Rooney  Clinical Audiologist

## 2025-03-27 DIAGNOSIS — I10 ESSENTIAL HYPERTENSION: ICD-10-CM

## 2025-03-27 RX ORDER — NIFEDIPINE 90 MG/1
90 TABLET, EXTENDED RELEASE ORAL DAILY
Qty: 90 TABLET | Refills: 3 | Status: SHIPPED | OUTPATIENT
Start: 2025-03-27

## 2025-04-04 ENCOUNTER — APPOINTMENT (OUTPATIENT)
Dept: RADIOLOGY | Facility: CLINIC | Age: 75
End: 2025-04-04
Payer: MEDICARE

## 2025-04-08 ENCOUNTER — APPOINTMENT (OUTPATIENT)
Dept: AUDIOLOGY | Facility: CLINIC | Age: 75
End: 2025-04-08
Payer: MEDICARE

## 2025-04-11 ENCOUNTER — HOSPITAL ENCOUNTER (OUTPATIENT)
Dept: RADIOLOGY | Facility: CLINIC | Age: 75
Discharge: HOME | End: 2025-04-11
Payer: MEDICARE

## 2025-04-11 VITALS — WEIGHT: 239 LBS | HEIGHT: 66 IN | BODY MASS INDEX: 38.41 KG/M2

## 2025-04-11 DIAGNOSIS — Z12.31 BREAST CANCER SCREENING BY MAMMOGRAM: ICD-10-CM

## 2025-04-11 PROCEDURE — 77067 SCR MAMMO BI INCL CAD: CPT | Performed by: RADIOLOGY

## 2025-04-11 PROCEDURE — 77063 BREAST TOMOSYNTHESIS BI: CPT | Performed by: RADIOLOGY

## 2025-04-11 PROCEDURE — 77067 SCR MAMMO BI INCL CAD: CPT

## 2025-04-12 ENCOUNTER — TELEPHONE (OUTPATIENT)
Dept: PRIMARY CARE | Facility: CLINIC | Age: 75
End: 2025-04-12
Payer: MEDICARE

## 2025-04-12 DIAGNOSIS — Z12.31 ENCOUNTER FOR SCREENING MAMMOGRAM FOR MALIGNANT NEOPLASM OF BREAST: Primary | ICD-10-CM

## 2025-04-23 ENCOUNTER — APPOINTMENT (OUTPATIENT)
Dept: PHYSICAL THERAPY | Facility: CLINIC | Age: 75
End: 2025-04-23
Payer: MEDICARE
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
## 2025-05-14 ENCOUNTER — EVALUATION (OUTPATIENT)
Dept: PHYSICAL THERAPY | Facility: CLINIC | Age: 75
End: 2025-05-14
Payer: MEDICARE

## 2025-05-14 DIAGNOSIS — M51.369 DISC DEGENERATION, LUMBAR: ICD-10-CM

## 2025-05-14 DIAGNOSIS — G89.29 CHRONIC MIDLINE LOW BACK PAIN WITHOUT SCIATICA: ICD-10-CM

## 2025-05-14 DIAGNOSIS — M54.50 CHRONIC MIDLINE LOW BACK PAIN WITHOUT SCIATICA: ICD-10-CM

## 2025-05-14 DIAGNOSIS — M48.061 SPINAL STENOSIS OF LUMBAR REGION WITHOUT NEUROGENIC CLAUDICATION: Primary | ICD-10-CM

## 2025-05-14 PROCEDURE — 97162 PT EVAL MOD COMPLEX 30 MIN: CPT | Mod: GP | Performed by: PHYSICAL THERAPIST

## 2025-05-14 PROCEDURE — 97110 THERAPEUTIC EXERCISES: CPT | Mod: GP | Performed by: PHYSICAL THERAPIST

## 2025-05-14 ASSESSMENT — ENCOUNTER SYMPTOMS
DEPRESSION: 0
LOSS OF SENSATION IN FEET: 0
OCCASIONAL FEELINGS OF UNSTEADINESS: 1

## 2025-05-18 DIAGNOSIS — E03.9 HYPOTHYROIDISM (ACQUIRED): ICD-10-CM

## 2025-05-19 RX ORDER — LEVOTHYROXINE SODIUM 112 UG/1
TABLET ORAL
Qty: 90 TABLET | Refills: 3 | Status: SHIPPED | OUTPATIENT
Start: 2025-05-19

## 2025-05-22 ENCOUNTER — DOCUMENTATION (OUTPATIENT)
Dept: PHYSICAL THERAPY | Facility: CLINIC | Age: 75
End: 2025-05-22
Payer: MEDICARE

## 2025-05-22 NOTE — PROGRESS NOTES
Physical Therapy                 Therapy Communication Note    Patient Name: Bryan Garcia  MRN: [id number]  Department:   Room: Room/bed info not found  Today's Date: 5/22/2025     Discipline: Physical Therapy    Missed Visit:       Missed Visit Reason:      Missed Time: No Show    Comment:  First No Show

## 2025-05-24 NOTE — PROGRESS NOTES
Subjective   Patient ID: Bryan Garcia is a 74 y.o. female who presents for Follow-up.    Essential hypertension  BP stable on current regimen      Paroxysmal atrial fibrillation s/p ablation (8/16/2024)  Pacemaker placement (8/5/2024)  No palpitations, shortness of breath, chest pain, bleeding complications    Hyperlipidemia  Coronary artery calcification  No exertional chest pain or shortness of breath.  Annual follow-up with cardiology, Dr. Alfredo at [facility], next appointment in October     Chronic kidney disease (stage 4)  Secondary renal hyperparathyroidism  Vitamin D deficiency  Renal function has actually been improving compared to 1 year ago.  [facility] nephrology, Sabrina Weber (next appointment in September)      Venous insufficiency  First appointment with Dr. Nick in vascular medicine scheduled in June (previously followed by Dr. Linton, [facility] vascular medicine)     History of pulmonary embolism  Long-term anticoagulation with Eliquis     Anemia of renal disease   Patient remains on supplemental iron  CBC stable     Hypothyroidism   No unintentional weight gain or loss, constipation or diarrhea.    Lung nodule/history of smoking  Lung cancer screening with CT chest scheduled in June.      Obstructive sleep apnea (new diagnosis on sleep study in January 2025)  BiPAP has been ordered, patient yet to receive.  She will contact supplier.  Next appointment [facility] sleep medicine scheduled in July     GERD/heartburn  No breakthrough heartburn.  Remains on pantoprazole.  First appointment with [facility] care with new gastroenterologist, Dr. Shaina Siegel is scheduled in June.     Lumbar arthritis/stenosis with chronic back pain  After last visit, patient was referred to physical therapy which has been helping.  She is no longer having back pain radiating to right lower abdominal area     History of colon polyps  Next screening colonoscopy due in 3/2029     Hyperuricemia   Patient remains on allopurinol 50 mg weekly      Right tympanic membrane perforation (chronic)  Right-sided tinnitus  After last visit, consult with Dr. Yuen in ENT           Review of Systems   Constitutional:  Negative for fatigue.   HENT:  Negative for rhinorrhea.    Eyes:  Negative for visual disturbance.   Respiratory:  Negative for cough, shortness of breath and wheezing.    Cardiovascular:  Negative for chest pain and palpitations.   Gastrointestinal:  Negative for abdominal pain, constipation and diarrhea.   Genitourinary:  Negative for dysuria.   Musculoskeletal:  Positive for back pain.   Neurological:  Negative for dizziness and headaches.       Objective   /76 (BP Location: Right arm, Patient Position: Sitting)   Pulse 66   Wt 112 kg (246 lb)   LMP  (LMP Unknown)   BMI 39.71 kg/m²     Physical Exam  Vitals reviewed.   Constitutional:       Appearance: Normal appearance.   HENT:      Head: Normocephalic.      Right Ear: Tympanic membrane normal.      Left Ear: Tympanic membrane normal.      Mouth/Throat:      Mouth: Mucous membranes are moist.      Pharynx: No oropharyngeal exudate or posterior oropharyngeal erythema.   Eyes:      Conjunctiva/sclera: Conjunctivae normal.   Neck:      Vascular: No carotid bruit.   Cardiovascular:      Rate and Rhythm: Normal rate and regular rhythm.      Heart sounds: No murmur heard.  Pulmonary:      Effort: Pulmonary effort is normal.      Breath sounds: Normal breath sounds. No wheezing or rales.   Abdominal:      General: Bowel sounds are normal.      Palpations: Abdomen is soft.      Tenderness: There is no abdominal tenderness.   Musculoskeletal:      Comments: 2+ bilateral calf and foot edema   Lymphadenopathy:      Cervical: No cervical adenopathy.   Skin:     General: Skin is warm.      Findings: No rash.   Neurological:      General: No focal deficit present.      Mental Status: She is alert and oriented to person, place, and time.   Psychiatric:         Mood and Affect: Mood normal.          Assessment/Plan     Essential hypertension  Continue nifedipine XL 90 mg daily and torsemide 10 mg daily  Maintain low-salt diet  Continue home blood pressure monitoring and bring readings to each visit.    Goal for blood pressure is under 130/80.       Paroxysmal atrial fibrillation s/p ablation (8/16/2024)  Pacemaker placement (8/5/2024)  Continue Eliquis 5 mg twice a day  Follow-up with Dr. Hudson, next appointment in August     Hyperlipidemia  Coronary artery calcification  Continue rosuvastatin 10 mg daily  Continue well-balanced, low-fat/high-fiber diet  Continue annual follow-up with cardiology, Dr. Alfredo at [facility], next appointment in October     Chronic kidney disease (stage 4)  Secondary renal hyperparathyroidism  Vitamin D deficiency  Continue current medication regimen  Maintain adequate hydration, at least 50 ounces per day  Continue routine follow-up with [facility] nephrology, Sabrina Weber (next appointment in September)      Venous insufficiency  Maintain low-salt diet, leg elevation, and use of compression hose/socks  First appointment with Dr. Nick in vascular medicine scheduled in June (previously followed by Dr. Linton, [facility] vascular medicine)     History of pulmonary embolism  Continue long-term anticoagulation with Eliquis     Anemia of renal disease   Continue iron supplementation  Monitor expectantly     Hypothyroidism   Continue levothyroxine 112 mcg daily  TSH lab work ordered    Lung nodule/history of smoking  Continue lung cancer screening with CT chest scheduled in June.      Obstructive sleep apnea (new diagnosis on sleep study in January 2025)  BiPAP has been ordered, patient yet to receive.  She will contact supplier.  Next appointment [facility] sleep medicine scheduled in July     GERD/heartburn  Continue pantoprazole 40 mg daily  First appointment with establish care with new gastroenterologist, Dr. Shaina Siegel is scheduled in June.     Lumbar arthritis/stenosis with chronic back  pain, improved  Continue physical therapy     History of colon polyps  Next screening colonoscopy due in 3/2029     Hyperuricemia   Continue allopurinol 50 mg daily  Check uric acid level      Right tympanic membrane perforation (chronic)  Right-sided tinnitus  Continue follow-up with Dr. Yuen in ENT    Health maintenance  Regular exercise recommended     Follow-up  Office visit in September  (Additional lab work ordered to complete prior to visit)    Adán Meek MD

## 2025-05-27 ENCOUNTER — APPOINTMENT (OUTPATIENT)
Dept: PRIMARY CARE | Facility: CLINIC | Age: 75
End: 2025-05-27
Payer: MEDICARE

## 2025-05-27 VITALS
DIASTOLIC BLOOD PRESSURE: 76 MMHG | HEART RATE: 66 BPM | BODY MASS INDEX: 39.71 KG/M2 | WEIGHT: 246 LBS | SYSTOLIC BLOOD PRESSURE: 138 MMHG

## 2025-05-27 DIAGNOSIS — D63.1 ANEMIA OF RENAL DISEASE: ICD-10-CM

## 2025-05-27 DIAGNOSIS — Z95.0 PACEMAKER: ICD-10-CM

## 2025-05-27 DIAGNOSIS — I87.2 CHRONIC VENOUS INSUFFICIENCY: ICD-10-CM

## 2025-05-27 DIAGNOSIS — K21.9 GASTROESOPHAGEAL REFLUX DISEASE WITHOUT ESOPHAGITIS: ICD-10-CM

## 2025-05-27 DIAGNOSIS — N18.4 CKD (CHRONIC KIDNEY DISEASE) STAGE 4, GFR 15-29 ML/MIN (MULTI): ICD-10-CM

## 2025-05-27 DIAGNOSIS — E03.9 HYPOTHYROIDISM (ACQUIRED): ICD-10-CM

## 2025-05-27 DIAGNOSIS — R91.8 LUNG NODULES: ICD-10-CM

## 2025-05-27 DIAGNOSIS — I48.0 PAROXYSMAL ATRIAL FIBRILLATION (MULTI): ICD-10-CM

## 2025-05-27 DIAGNOSIS — H93.11 RIGHT-SIDED TINNITUS: ICD-10-CM

## 2025-05-27 DIAGNOSIS — E79.0 HYPERURICEMIA: ICD-10-CM

## 2025-05-27 DIAGNOSIS — Z86.711 HISTORY OF PULMONARY EMBOLISM: ICD-10-CM

## 2025-05-27 DIAGNOSIS — D50.9 IRON DEFICIENCY ANEMIA, UNSPECIFIED IRON DEFICIENCY ANEMIA TYPE: ICD-10-CM

## 2025-05-27 DIAGNOSIS — G47.33 OBSTRUCTIVE SLEEP APNEA: ICD-10-CM

## 2025-05-27 DIAGNOSIS — I25.10 CORONARY ARTERY CALCIFICATION: ICD-10-CM

## 2025-05-27 DIAGNOSIS — E55.9 VITAMIN D DEFICIENCY: ICD-10-CM

## 2025-05-27 DIAGNOSIS — N18.9 ANEMIA OF RENAL DISEASE: ICD-10-CM

## 2025-05-27 DIAGNOSIS — M48.061 SPINAL STENOSIS OF LUMBAR REGION WITHOUT NEUROGENIC CLAUDICATION: ICD-10-CM

## 2025-05-27 DIAGNOSIS — I10 ESSENTIAL HYPERTENSION: Primary | ICD-10-CM

## 2025-05-27 PROCEDURE — 1159F MED LIST DOCD IN RCRD: CPT | Performed by: INTERNAL MEDICINE

## 2025-05-27 PROCEDURE — 3078F DIAST BP <80 MM HG: CPT | Performed by: INTERNAL MEDICINE

## 2025-05-27 PROCEDURE — G2211 COMPLEX E/M VISIT ADD ON: HCPCS | Performed by: INTERNAL MEDICINE

## 2025-05-27 PROCEDURE — 99214 OFFICE O/P EST MOD 30 MIN: CPT | Performed by: INTERNAL MEDICINE

## 2025-05-27 PROCEDURE — 1157F ADVNC CARE PLAN IN RCRD: CPT | Performed by: INTERNAL MEDICINE

## 2025-05-27 PROCEDURE — 3075F SYST BP GE 130 - 139MM HG: CPT | Performed by: INTERNAL MEDICINE

## 2025-05-27 NOTE — PATIENT INSTRUCTIONS
Essential hypertension  Continue nifedipine XL 90 mg daily and torsemide 10 mg daily  Maintain low-salt diet  Continue home blood pressure monitoring and bring readings to each visit.    Goal for blood pressure is under 130/80.       Paroxysmal atrial fibrillation s/p ablation (8/16/2024)  Pacemaker placement (8/5/2024)  Continue Eliquis 5 mg twice a day  Follow-up with Dr. Hudson, next appointment in August     Hyperlipidemia  Coronary artery calcification  Continue rosuvastatin 10 mg daily  Continue well-balanced, low-fat/high-fiber diet  Continue annual follow-up with cardiology, Dr. Alfredo at [facility], next appointment in October     Chronic kidney disease (stage 4)  Secondary renal hyperparathyroidism  Vitamin D deficiency  Continue current medication regimen  Maintain adequate hydration, at least 50 ounces per day  Continue routine follow-up with [facility] nephrology, Sabrina Weber (next appointment in September)      Venous insufficiency  Maintain low-salt diet, leg elevation, and use of compression hose/socks  First appointment with Dr. Nick in vascular medicine scheduled in June (previously followed by Dr. Linton, [facility] vascular medicine)     History of pulmonary embolism  Continue long-term anticoagulation with Eliquis     Anemia of renal disease   Continue iron supplementation  Monitor expectantly     Hypothyroidism   Continue levothyroxine 112 mcg daily  TSH lab work ordered    Lung nodule/history of smoking  Continue lung cancer screening with CT chest scheduled in June.      Obstructive sleep apnea (new diagnosis on sleep study in January 2025)  BiPAP has been ordered, patient yet to receive.  She will contact supplier.  Next appointment [facility] sleep medicine scheduled in July     GERD/heartburn  Continue pantoprazole 40 mg daily  First appointment with [facility] care with new gastroenterologist, Dr. Shaina Siegel is scheduled in June.     Lumbar arthritis/stenosis with chronic back pain, improved  Continue  physical therapy     History of colon polyps  Next screening colonoscopy due in 3/2029     Hyperuricemia (uric acid level elevated at 8.1)  Continue allopurinol 50 mg daily  Check uric acid level at next visit     Right tympanic membrane perforation (chronic)  Right-sided tinnitus  Continue follow-up with Dr. Yuen in ENT    Health maintenance  Regular exercise recommended     Follow-up  Office visit in September  (Additional lab work ordered to complete prior to visit)

## 2025-05-28 LAB
ALBUMIN SERPL-MCNC: 4.1 G/DL (ref 3.6–5.1)
BASOPHILS # BLD AUTO: 57 CELLS/UL (ref 0–200)
BASOPHILS NFR BLD AUTO: 0.9 %
BUN SERPL-MCNC: 23 MG/DL (ref 7–25)
BUN/CREAT SERPL: 16 (CALC) (ref 6–22)
CALCIUM SERPL-MCNC: 9.4 MG/DL (ref 8.6–10.4)
CHLORIDE SERPL-SCNC: 105 MMOL/L (ref 98–110)
CO2 SERPL-SCNC: 26 MMOL/L (ref 20–32)
CREAT SERPL-MCNC: 1.41 MG/DL (ref 0.6–1)
EGFRCR SERPLBLD CKD-EPI 2021: 39 ML/MIN/1.73M2
EOSINOPHIL # BLD AUTO: 170 CELLS/UL (ref 15–500)
EOSINOPHIL NFR BLD AUTO: 2.7 %
ERYTHROCYTE [DISTWIDTH] IN BLOOD BY AUTOMATED COUNT: 14.3 % (ref 11–15)
GLUCOSE SERPL-MCNC: 95 MG/DL (ref 65–99)
HCT VFR BLD AUTO: 43.5 % (ref 35–45)
HGB BLD-MCNC: 13.7 G/DL (ref 11.7–15.5)
LYMPHOCYTES # BLD AUTO: 1726 CELLS/UL (ref 850–3900)
LYMPHOCYTES NFR BLD AUTO: 27.4 %
MCH RBC QN AUTO: 26.6 PG (ref 27–33)
MCHC RBC AUTO-ENTMCNC: 31.5 G/DL (ref 32–36)
MCV RBC AUTO: 84.5 FL (ref 80–100)
MONOCYTES # BLD AUTO: 573 CELLS/UL (ref 200–950)
MONOCYTES NFR BLD AUTO: 9.1 %
NEUTROPHILS # BLD AUTO: 3774 CELLS/UL (ref 1500–7800)
NEUTROPHILS NFR BLD AUTO: 59.9 %
PHOSPHATE SERPL-MCNC: 3.1 MG/DL (ref 2.1–4.3)
PLATELET # BLD AUTO: 171 THOUSAND/UL (ref 140–400)
PMV BLD REES-ECKER: 10.6 FL (ref 7.5–12.5)
POTASSIUM SERPL-SCNC: 4.2 MMOL/L (ref 3.5–5.3)
RBC # BLD AUTO: 5.15 MILLION/UL (ref 3.8–5.1)
SODIUM SERPL-SCNC: 142 MMOL/L (ref 135–146)
TSH SERPL-ACNC: 1.27 MIU/L (ref 0.4–4.5)
URATE SERPL-MCNC: 7.7 MG/DL (ref 2.5–7)
WBC # BLD AUTO: 6.3 THOUSAND/UL (ref 3.8–10.8)

## 2025-05-28 ASSESSMENT — ENCOUNTER SYMPTOMS
ABDOMINAL PAIN: 0
SHORTNESS OF BREATH: 0
CONSTIPATION: 0
FATIGUE: 0
RHINORRHEA: 0
HEADACHES: 0
DIARRHEA: 0
WHEEZING: 0
DIZZINESS: 0
COUGH: 0
DYSURIA: 0
PALPITATIONS: 0
BACK PAIN: 1

## 2025-05-31 ENCOUNTER — TELEPHONE (OUTPATIENT)
Dept: PRIMARY CARE | Facility: CLINIC | Age: 75
End: 2025-05-31
Payer: MEDICARE

## 2025-05-31 NOTE — TELEPHONE ENCOUNTER
Patient and I spoke.  Labs including renal function panel, CBC, TSH all are at goal or stable.    No changes are recommended.    Adán Meek MD

## 2025-06-02 PROBLEM — G89.29 CHRONIC MIDLINE LOW BACK PAIN WITHOUT SCIATICA: Status: ACTIVE | Noted: 2025-06-02

## 2025-06-02 PROBLEM — M54.50 CHRONIC MIDLINE LOW BACK PAIN WITHOUT SCIATICA: Status: ACTIVE | Noted: 2025-06-02

## 2025-06-02 NOTE — PROGRESS NOTES
"Physical Therapy    Physical Therapy Evaluation and Treatment      Patient Name: Bryan Garcia  MRN: [id number]  Today's Date: 5/14/25  Visit 1  Time Entry:   Time Calculation  Start Time: 1210  Stop Time: 1300  Time Calculation (min): 50 min     PT Therapeutic Procedures Time Entry  Therapeutic Exercise Time Entry: 20  Self-Care/Home Mgmt Training: 10                   Assessment:    Bryan Garcia is challenged with a sustained sit tall posture. She needs tactile cuing for vertical pelvis alignment and reminders to keep head up looking on horizon. Post treatment Bryan Garcia reported \" I feel like I can stand up taller!\"        Plan:   Supine and standing lumbar mobility and stabilization training progression as tolerated        Problem List Items Addressed This Visit           ICD-10-CM    Disc degeneration, lumbar M51.369    Spinal stenosis of lumbar region without neurogenic claudication - Primary M48.061    Chronic midline low back pain without sciatica M54.50, G89.29        Subjective    Bryan Garcia having a better day than at initial eval . Doing HEP OK     Precautions:   Stage 3 kidney disease       Pain:   4/10 central lumbar    Drives car  Objective                Treatments:  Seated Anti-Rotation with green tband 20 sec holds 3 x each side  Seated hip abduction PRE with blue band 2 x 10  Seated forward bend in chair/ lumbar flexion  3 x 20 sec ( return to sit tall posture)  Sustained sit tall posture 3 x 10 sec with tactile and verbal cues to keep pelvis vertical and head up  EDUCATION:  Discussed advantage of lumbosacral cinch up brace to support spine and increase upright standing tolerance. Reviewed brace options on internet and gave Bryan Garcia a written description of the proper support. Bryan Garcia given blue tband forhipabduction PRE  Access Code: [email or web address]  URL: [email or web address]  Date: 06/03/2025  Prepared by: Adán" Marleny    Exercises  - Seated Hip Abduction with Resistance  - 1 x daily - 7 x weekly - 2 sets - 10 reps  - Seated Flexion Stretch  - 1 x daily - 7 x weekly - 20 hold  Goals:  1. Reduce LBP to 1-4/10   2. independent HEP  3. Bryan Garcia will be able to ambulate 300 ft w/cane without aggravating familiar pain  4. Improve outcome score by 4 points    Insurance Authorization Information  Date of Evaluation: 25    Onset Date: 2025    Referring Physician: Adán Meek     Surgery in the Last 3 months:  no    CPT Codes: Therapeutic Exercise: 59429 Therapeutic Activity: 85945 Neuromuscular Re-Education: 66923 Manual Therapy: 03533 Gait Trainin Self-Care/Home Management Trainin Mechanical Traction: 31706 Electric Stimulation (Attended): 91784 Electric Stimulation (Unattended): 07751 Vasopneumatic Device: 82464 PT Re-Evaluation: 87820     Diagnosis:   Problem List Items Addressed This Visit           ICD-10-CM    Disc degeneration, lumbar M51.369    Spinal stenosis of lumbar region without neurogenic claudication - Primary M48.061    Chronic midline low back pain without sciatica M54.50, G89.29          Functional Outcome:       OT / PT Evaluation complexity:  moderate    Which of the following best describes the primary reason of therapy: Improving, restoring, or adapting functional mobility or skills    Visits Requested: 20    Date Range: 90 days    Select all conditions that apply: Arthritis Conditions         Adán Farmer, PT

## 2025-06-03 ENCOUNTER — TREATMENT (OUTPATIENT)
Dept: PHYSICAL THERAPY | Facility: CLINIC | Age: 75
End: 2025-06-03
Payer: MEDICARE

## 2025-06-03 DIAGNOSIS — M48.061 SPINAL STENOSIS OF LUMBAR REGION WITHOUT NEUROGENIC CLAUDICATION: Primary | ICD-10-CM

## 2025-06-03 DIAGNOSIS — M51.369 DISC DEGENERATION, LUMBAR: ICD-10-CM

## 2025-06-03 DIAGNOSIS — G89.29 CHRONIC MIDLINE LOW BACK PAIN WITHOUT SCIATICA: ICD-10-CM

## 2025-06-03 DIAGNOSIS — M54.50 CHRONIC MIDLINE LOW BACK PAIN WITHOUT SCIATICA: ICD-10-CM

## 2025-06-03 PROCEDURE — 97535 SELF CARE MNGMENT TRAINING: CPT | Mod: GP | Performed by: PHYSICAL THERAPIST

## 2025-06-03 PROCEDURE — 97110 THERAPEUTIC EXERCISES: CPT | Mod: GP | Performed by: PHYSICAL THERAPIST

## 2025-06-09 ENCOUNTER — HOSPITAL ENCOUNTER (OUTPATIENT)
Dept: RADIOLOGY | Facility: CLINIC | Age: 75
Discharge: HOME | End: 2025-06-09
Payer: MEDICARE

## 2025-06-09 DIAGNOSIS — R91.8 LUNG NODULES: ICD-10-CM

## 2025-06-09 PROCEDURE — 71250 CT THORAX DX C-: CPT | Performed by: RADIOLOGY

## 2025-06-09 PROCEDURE — 71250 CT THORAX DX C-: CPT

## 2025-06-18 ENCOUNTER — OFFICE VISIT (OUTPATIENT)
Dept: CARDIOLOGY | Facility: CLINIC | Age: 75
End: 2025-06-18
Payer: MEDICARE

## 2025-06-18 VITALS
SYSTOLIC BLOOD PRESSURE: 135 MMHG | DIASTOLIC BLOOD PRESSURE: 86 MMHG | RESPIRATION RATE: 18 BRPM | HEIGHT: 67 IN | HEART RATE: 81 BPM | WEIGHT: 243 LBS | BODY MASS INDEX: 38.14 KG/M2

## 2025-06-18 DIAGNOSIS — I87.393 CHRONIC VENOUS HYPERTENSION WITH COMPLICATION INVOLVING BOTH SIDES: Primary | ICD-10-CM

## 2025-06-18 DIAGNOSIS — I87.2 CHRONIC VENOUS INSUFFICIENCY: ICD-10-CM

## 2025-06-18 PROCEDURE — 1159F MED LIST DOCD IN RCRD: CPT | Performed by: INTERNAL MEDICINE

## 2025-06-18 PROCEDURE — 1160F RVW MEDS BY RX/DR IN RCRD: CPT | Performed by: INTERNAL MEDICINE

## 2025-06-18 PROCEDURE — 99204 OFFICE O/P NEW MOD 45 MIN: CPT | Performed by: INTERNAL MEDICINE

## 2025-06-18 PROCEDURE — 99213 OFFICE O/P EST LOW 20 MIN: CPT

## 2025-06-18 PROCEDURE — 1125F AMNT PAIN NOTED PAIN PRSNT: CPT | Performed by: INTERNAL MEDICINE

## 2025-06-18 PROCEDURE — 3008F BODY MASS INDEX DOCD: CPT | Performed by: INTERNAL MEDICINE

## 2025-06-18 PROCEDURE — G2211 COMPLEX E/M VISIT ADD ON: HCPCS | Performed by: INTERNAL MEDICINE

## 2025-06-18 PROCEDURE — 3075F SYST BP GE 130 - 139MM HG: CPT | Performed by: INTERNAL MEDICINE

## 2025-06-18 PROCEDURE — 3079F DIAST BP 80-89 MM HG: CPT | Performed by: INTERNAL MEDICINE

## 2025-06-18 ASSESSMENT — COLUMBIA-SUICIDE SEVERITY RATING SCALE - C-SSRS
1. IN THE PAST MONTH, HAVE YOU WISHED YOU WERE DEAD OR WISHED YOU COULD GO TO SLEEP AND NOT WAKE UP?: NO
6. HAVE YOU EVER DONE ANYTHING, STARTED TO DO ANYTHING, OR PREPARED TO DO ANYTHING TO END YOUR LIFE?: NO
2. HAVE YOU ACTUALLY HAD ANY THOUGHTS OF KILLING YOURSELF?: NO

## 2025-06-18 ASSESSMENT — PAIN SCALES - GENERAL: PAINLEVEL_OUTOF10: 2

## 2025-06-18 ASSESSMENT — PATIENT HEALTH QUESTIONNAIRE - PHQ9
2. FEELING DOWN, DEPRESSED OR HOPELESS: NOT AT ALL
SUM OF ALL RESPONSES TO PHQ9 QUESTIONS 1 AND 2: 0
1. LITTLE INTEREST OR PLEASURE IN DOING THINGS: NOT AT ALL

## 2025-06-18 NOTE — PROGRESS NOTES
OUTPATIENT CONSULTATION -  VASCULAR MEDICINE    DOS: 6/18/2025    REQUESTING PHYSICIAN:  Adán Meek MD  Ref Provider (PCP)    REASON FOR CONSULT:  here for evaluation of leg and ankle swelling    HISTORY OF PRESENT ILLNESS:     75 yo lady here for evaluation of leg and ankle swelling. Reports this has been present for years. Reports this is better when she is off her legs. Feels this is also better since the PPM was placed. Reports this was done for afib and related sx. Feels the swelling is worse when she does a lot of walking or sitting. Sleeps in bed - naps in the chair. Uses a can for ambulation - since 2019 - this is related to orthopedic issues. Has tried compression but has trouble donning bc of mobility.     Prev seen by Dr. Linton - recommended compression. States this was ?20-30 mmHg.     Currently on demadex. Not on spironolactone. Denies liver disease. Does not walk much.     I have personally reviewed the following lab and imaging results:  Venous duplex 2021 [facility] - neg for DVT  Notes from Dr. Baldo Linton 2021/2022    The left ventricle is normal in size. Left ventricular systolic function is   normal. EF = 65 ± 5% (2D 4-ch.)   - The right ventricle is normal in size. Right ventricular systolic function is   normal.   - There is no patent foramen ovale as detected by saline contrast with valsalva.   - There are no significant valvular abnormalities.   - Exam was compared with the prior  echocardiographic exam performed on   10/02/2023. There has been no significant interval change.     PMH/PSH:    ROBBIE 2021/2023  Left TKA 2019  PE 2003 after surgery  Gastric bypass 2003  CKD - on transplant list  Afib  PPM  RFA ablation  Gout  Hypothyroidism  HTN  Foot surgery remote  HPL  BART has CPAP x 1 week  Hysterectomy for prolapse  OA  Chronic back pain    FAMILY HISTORY:     + VTE - daughter  No CTD  No AAA    SOCIAL HISTORY:     Tobacco quit 2008   Employment sec  - ret    REVIEW OF SYSTEMS:     No  "fevers, chills, weight is stable  No sores, ulcers, rashes, + skin lesions  No HA, SZ, syncope, stroke, TIA  No CP, chest pressure  No cough, SOB  No BRBPR, melena, hematuria  No bleeding  + edema, no calf pain  + ambulatory leg pain - thighs and calves  No parasthesias  UTD mammo, colonoscopy    PHYSICAL EXAMINATION:     /86 (BP Location: Left arm, Patient Position: Sitting)   Pulse 81   Resp 18   Ht 1.702 m (5' 7\")   Wt 110 kg (243 lb)   LMP  (LMP Unknown)   BMI 38.06 kg/m²     Gen: Appears well, NAD  HEENT: WNL  Chest: CTA  CVS: regular without murmur; +S4 gallop  Abd: soft, NT/ND,   Ext: 2+ brawny edema to the knees, nontender  Skin: LDS and hemosiderin staining mauri  Pulses: DP / PT - multiphasic  Neuro: grossly normal, CN intact, VALENTIN x 4  Mood and affect appropriate    ADDITIONAL DATA:   No compression worn today. Right calf:  48.5cm  Left calf:  48.5cm    Reports ECHO pending Nov  ASSESSMENT/PLAN:   here for evaluation of leg and ankle swelling - dependent, CVI, loss of the calf muscl;e pump and central obesity contributing. No offending meds. May have some relationship to BART and use of the CPAP may help, too.     discussed edemawear and tubigrip now. Walk 3 minutes every hour. Foot and ankle exercises while sitting. Elevate the foot of the bed 3-4 inches.    Follow up 2 months.    "

## 2025-06-18 NOTE — PATIENT INSTRUCTIONS
ASSESSMENT/PLAN:   here for evaluation of leg and ankle swelling - discussed edemawear and tubigrip now. Walk 3 minutes every hour. Foot and ankle exercises while sitting. Elevate the foot of the bed 3-4 inches.    Follow up 2 months.

## 2025-06-28 ENCOUNTER — TELEPHONE (OUTPATIENT)
Dept: PRIMARY CARE | Facility: CLINIC | Age: 75
End: 2025-06-28
Payer: MEDICARE

## 2025-06-28 DIAGNOSIS — R91.8 LUNG NODULES: Primary | ICD-10-CM

## 2025-06-28 NOTE — TELEPHONE ENCOUNTER
Current CT chest for lung nodule follow-up and lung cancer screening in this former smoker shows new left upper lobe 5 mm lung nodule.  There are other nodules present.  Patient has had various CT imaging studies remotely at  and more recently at [facility].    She would benefit from referral to lung nodule clinic/center.  Referral has been placed.    I have left a voicemail for patient letting her know my office will be in touch regarding referral.    Adán Meek MD

## 2025-07-01 ENCOUNTER — APPOINTMENT (OUTPATIENT)
Dept: GASTROENTEROLOGY | Facility: CLINIC | Age: 75
End: 2025-07-01
Payer: MEDICARE

## 2025-07-07 DIAGNOSIS — Z86.711 HISTORY OF PULMONARY EMBOLISM: ICD-10-CM

## 2025-07-07 DIAGNOSIS — I48.0 PAROXYSMAL ATRIAL FIBRILLATION (MULTI): ICD-10-CM

## 2025-07-07 NOTE — TELEPHONE ENCOUNTER
Eliquis 5 mg twice daily pended for [facility]'s pharmacy.  Please sign.  Thank you

## 2025-07-15 ENCOUNTER — OFFICE VISIT (OUTPATIENT)
Dept: PRIMARY CARE | Facility: CLINIC | Age: 75
End: 2025-07-15
Payer: MEDICARE

## 2025-07-15 VITALS
DIASTOLIC BLOOD PRESSURE: 85 MMHG | OXYGEN SATURATION: 99 % | BODY MASS INDEX: 37.92 KG/M2 | WEIGHT: 241.6 LBS | HEART RATE: 79 BPM | HEIGHT: 67 IN | SYSTOLIC BLOOD PRESSURE: 135 MMHG | TEMPERATURE: 98.2 F

## 2025-07-15 DIAGNOSIS — R91.8 LUNG NODULES: Primary | ICD-10-CM

## 2025-07-15 PROCEDURE — 99212 OFFICE O/P EST SF 10 MIN: CPT | Performed by: NURSE PRACTITIONER

## 2025-07-15 PROCEDURE — 3008F BODY MASS INDEX DOCD: CPT | Performed by: NURSE PRACTITIONER

## 2025-07-15 PROCEDURE — 99202 OFFICE O/P NEW SF 15 MIN: CPT | Performed by: NURSE PRACTITIONER

## 2025-07-15 PROCEDURE — 1036F TOBACCO NON-USER: CPT | Performed by: NURSE PRACTITIONER

## 2025-07-15 PROCEDURE — 3075F SYST BP GE 130 - 139MM HG: CPT | Performed by: NURSE PRACTITIONER

## 2025-07-15 PROCEDURE — 1159F MED LIST DOCD IN RCRD: CPT | Performed by: NURSE PRACTITIONER

## 2025-07-15 PROCEDURE — 3079F DIAST BP 80-89 MM HG: CPT | Performed by: NURSE PRACTITIONER

## 2025-07-15 PROCEDURE — 1126F AMNT PAIN NOTED NONE PRSNT: CPT | Performed by: NURSE PRACTITIONER

## 2025-07-15 RX ORDER — FLUTICASONE FUROATE AND VILANTEROL 100; 25 UG/1; UG/1
1 POWDER RESPIRATORY (INHALATION)
COMMUNITY
Start: 2025-07-14

## 2025-07-15 ASSESSMENT — ENCOUNTER SYMPTOMS
LOSS OF SENSATION IN FEET: 0
OCCASIONAL FEELINGS OF UNSTEADINESS: 1
DEPRESSION: 0

## 2025-07-15 ASSESSMENT — PAIN SCALES - GENERAL: PAINLEVEL_OUTOF10: 0-NO PAIN

## 2025-07-15 NOTE — PATIENT INSTRUCTIONS
New left upper lobe pulmonary nodule measuring up to 5 mm.  Stable right upper lobe nodule measuring 5 mm.  6 mm right lower lobe nodule is no longer present.    Recommend CT chest in 6 months.  Patient will be notified of results as they become available.  Continue with Pulmonology recommendations.  Seen yesterday at [facility].          Lung Nodule Clinic  [address]  [address]  [place], [place] [address]  Phone [phone]  Fax [phone]  Nurse Coordinator [phone]                                          Welcome to the [facility] Lung [facility]odule [facility]    Today was the initial consult with the lung nodule clinic to determine proper recommendations for follow up. Your care is coordinated to ensure timely management.  As you know, early detection of cancer is very important.  Nodules that are large, look suspicious or have changed over time is why further evaluation such as the additional imaging test that we have ordered is needed. Our clinic will work closely with you in choosing the best next step.       What is my next step?  We will assist with scheduling scans, results reviews, and referrals for priority appointments.      Who do I call?  Your care coordinator for the lung nodule clinic can be contacted at [phone]  All scheduling needs can be assisted within the Cardiac Surgery/Thoracic Surgery/Lung Nodule Clinic offices at [phone].              Table  Lianet H, Ayla DP, Ivon WHITE, et al. Guidelines for Management of Incidental Pulmonary Nodules Detected on CT Images: From the Fleischner Society 2017. Radiology 2017;284:228-243.

## 2025-07-15 NOTE — PROGRESS NOTES
Subjective   Patient ID: Bryan Garcia is a 74 y.o. female who presents for New Patient Visit (Bryan Garcia has a new visit regarding a lung nodule.  She is a former smoker of 29 years one pack per day.  No personal history of cancer. Two sisters had breast cancer. Father had prostate. Brother had stomach cancer. A different sister had uterine cancer. Mother had breast cancer. /).  HPI 74-year-old female presents today for lung nodule clinic.    Smoking history: 1 pack/day for 29 years.  Quit 2008  Personal history of cancer: No  Family history of lung cancer: 2 sisters have breast cancer.  Father with prostate cancer.  Brother had stomach cancer.  Another sister with uterine cancer.  Mom had breast cancer.      [facility] Pulmonology following - breathing test yesterday.     CT chest without IV contrast dated 6/9/2025  The nodule, seen in the right lower lobe on the examination of  01/28/2025 is no longer present.      A nodule in the right upper lobe at image 81 of series 3 measures 5  mm size and is stable from 01/31/2018 and 01/31/2017.      A new nodule is seen, in the lingular segment of the left upper lobe  in a subpleural location measuring 5 mm in size at image 105/270 of  series 3. This is not seen on examinations performed on 01/31/2017  and 01/31/2018. This area was not included on the CT abdomen study of  01/28/2025.    CT abdomen and pelvis without IV contrast dated January 28, 2025  Images through the lung bases  reveal mild areas of atelectasis. 6 mm  nodule in the right lower lobe, image 51 of 171.    CT chest low-dose screening dated 9/11/2023  Nodule 1:  This solid nodule is located in the right upper lobe on slice   number 110 with an average diameter of 5 mm .  Stable   Nodule 2:  This solid nodule is located in the left upper lobe on slice   number 177 with an average diameter of 4 mm.  Stable     Other lung nodule comments: A few additional tiny LungRADS category 2 nodules (for  example within the anterior right upper lobe at 5:74,   subpleural right middle lobe (5:183), along the left major fissure   (5:173)) remains stable. No suspicious new or enlarging pulmonary nodule.  Ill-defined curvilinear opacity within the superior segment of the right  lower lobe (5:167) is stable from priors, consistent with focal scarring.     Review of Systems  Review of systems: Present-feeling well. Not present-chills, fatigue and fever.  Respiratory: BART.  Not present-difficulty breathing, cough, bloody sputum.  Cardiovascular: Not present-chest pain, palpitations, dyspnea on exertion.  Objective   LMP  (LMP Unknown)      Physical Exam  Gen.: Mental status-alert. Gen. appearance-cooperative, well groomed and consistent with stated age. Not in acute distress or sickly. Orientation-oriented to time, place, purpose and person. Build and nutrition-well-nourished and well-developed. Hydration-well-hydrated.    Integumentary: Color-normal coloration skin. Skin moisture-normal skin moisture.    Chest and lung exam: Auscultation-normal breath sounds, no adventitious lung sounds and normal vocal resonance. Chest wall is normal in shape and non-tender.    Cardiovascular: Auscultation: Regular rate and rhythm. Heart sounds-normal heart sounds, S1-S2. No murmurs appreciated.   Assessment/Plan   Diagnoses and all orders for this visit:  Lung nodules  -     CT chest wo IV contrast; Future

## 2025-07-17 DIAGNOSIS — M1A.0790 CHRONIC GOUT OF FOOT, UNSPECIFIED CAUSE, UNSPECIFIED LATERALITY: ICD-10-CM

## 2025-07-17 RX ORDER — ALLOPURINOL 100 MG/1
50 TABLET ORAL
Qty: 45 TABLET | Refills: 3 | Status: SHIPPED | OUTPATIENT
Start: 2025-07-17

## 2025-08-13 ENCOUNTER — APPOINTMENT (OUTPATIENT)
Dept: GASTROENTEROLOGY | Facility: CLINIC | Age: 75
End: 2025-08-13
Payer: MEDICARE

## 2025-08-18 DIAGNOSIS — N18.4 CKD (CHRONIC KIDNEY DISEASE) STAGE 4, GFR 15-29 ML/MIN (MULTI): ICD-10-CM

## 2025-08-18 DIAGNOSIS — E03.9 HYPOTHYROIDISM (ACQUIRED): ICD-10-CM

## 2025-08-19 ENCOUNTER — APPOINTMENT (OUTPATIENT)
Dept: WOUND CARE | Facility: HOSPITAL | Age: 75
End: 2025-08-19
Payer: MEDICARE

## 2025-09-12 ENCOUNTER — APPOINTMENT (OUTPATIENT)
Dept: PRIMARY CARE | Facility: CLINIC | Age: 75
End: 2025-09-12
Payer: MEDICARE

## 2025-09-23 ENCOUNTER — APPOINTMENT (OUTPATIENT)
Dept: AUDIOLOGY | Facility: CLINIC | Age: 75
End: 2025-09-23
Payer: MEDICARE

## 2025-09-23 ENCOUNTER — APPOINTMENT (OUTPATIENT)
Dept: OTOLARYNGOLOGY | Facility: CLINIC | Age: 75
End: 2025-09-23
Payer: MEDICARE

## 2026-01-28 ENCOUNTER — APPOINTMENT (OUTPATIENT)
Dept: PRIMARY CARE | Facility: CLINIC | Age: 76
End: 2026-01-28
Payer: MEDICARE